# Patient Record
Sex: FEMALE | Race: WHITE | NOT HISPANIC OR LATINO | Employment: UNEMPLOYED | URBAN - METROPOLITAN AREA
[De-identification: names, ages, dates, MRNs, and addresses within clinical notes are randomized per-mention and may not be internally consistent; named-entity substitution may affect disease eponyms.]

---

## 2017-05-12 ENCOUNTER — HOSPITAL ENCOUNTER (OUTPATIENT)
Dept: LAB | Facility: MEDICAL CENTER | Age: 32
End: 2017-05-12
Attending: OBSTETRICS & GYNECOLOGY
Payer: COMMERCIAL

## 2017-05-12 LAB
ALBUMIN SERPL BCP-MCNC: 4.6 G/DL (ref 3.2–4.9)
ALBUMIN/GLOB SERPL: 1.9 G/DL
ALP SERPL-CCNC: 55 U/L (ref 30–99)
ALT SERPL-CCNC: 17 U/L (ref 2–50)
ANION GAP SERPL CALC-SCNC: 5 MMOL/L (ref 0–11.9)
AST SERPL-CCNC: 16 U/L (ref 12–45)
BASOPHILS # BLD AUTO: 0.8 % (ref 0–1.8)
BASOPHILS # BLD: 0.04 K/UL (ref 0–0.12)
BILIRUB SERPL-MCNC: 1 MG/DL (ref 0.1–1.5)
BUN SERPL-MCNC: 17 MG/DL (ref 8–22)
CALCIUM SERPL-MCNC: 9.4 MG/DL (ref 8.5–10.5)
CHLORIDE SERPL-SCNC: 106 MMOL/L (ref 96–112)
CHOLEST SERPL-MCNC: 173 MG/DL (ref 100–199)
CO2 SERPL-SCNC: 26 MMOL/L (ref 20–33)
CREAT SERPL-MCNC: 0.75 MG/DL (ref 0.5–1.4)
EOSINOPHIL # BLD AUTO: 0.12 K/UL (ref 0–0.51)
EOSINOPHIL NFR BLD: 2.4 % (ref 0–6.9)
ERYTHROCYTE [DISTWIDTH] IN BLOOD BY AUTOMATED COUNT: 42.1 FL (ref 35.9–50)
GFR SERPL CREATININE-BSD FRML MDRD: >60 ML/MIN/1.73 M 2
GLOBULIN SER CALC-MCNC: 2.4 G/DL (ref 1.9–3.5)
GLUCOSE SERPL-MCNC: 84 MG/DL (ref 65–99)
HCT VFR BLD AUTO: 42.5 % (ref 37–47)
HDLC SERPL-MCNC: 52 MG/DL
HGB BLD-MCNC: 13.7 G/DL (ref 12–16)
IMM GRANULOCYTES # BLD AUTO: 0.01 K/UL (ref 0–0.11)
IMM GRANULOCYTES NFR BLD AUTO: 0.2 % (ref 0–0.9)
LDLC SERPL CALC-MCNC: 99 MG/DL
LYMPHOCYTES # BLD AUTO: 1.92 K/UL (ref 1–4.8)
LYMPHOCYTES NFR BLD: 39.1 % (ref 22–41)
MCH RBC QN AUTO: 29.3 PG (ref 27–33)
MCHC RBC AUTO-ENTMCNC: 32.2 G/DL (ref 33.6–35)
MCV RBC AUTO: 90.8 FL (ref 81.4–97.8)
MONOCYTES # BLD AUTO: 0.27 K/UL (ref 0–0.85)
MONOCYTES NFR BLD AUTO: 5.5 % (ref 0–13.4)
NEUTROPHILS # BLD AUTO: 2.55 K/UL (ref 2–7.15)
NEUTROPHILS NFR BLD: 52 % (ref 44–72)
NRBC # BLD AUTO: 0 K/UL
NRBC BLD AUTO-RTO: 0 /100 WBC
PLATELET # BLD AUTO: 263 K/UL (ref 164–446)
PMV BLD AUTO: 11.1 FL (ref 9–12.9)
POTASSIUM SERPL-SCNC: 4.2 MMOL/L (ref 3.6–5.5)
PROT SERPL-MCNC: 7 G/DL (ref 6–8.2)
RBC # BLD AUTO: 4.68 M/UL (ref 4.2–5.4)
SODIUM SERPL-SCNC: 137 MMOL/L (ref 135–145)
T4 FREE SERPL-MCNC: 0.78 NG/DL (ref 0.53–1.43)
TRIGL SERPL-MCNC: 108 MG/DL (ref 0–149)
TSH SERPL DL<=0.005 MIU/L-ACNC: 2.28 UIU/ML (ref 0.3–3.7)
WBC # BLD AUTO: 4.9 K/UL (ref 4.8–10.8)

## 2017-05-12 PROCEDURE — 80061 LIPID PANEL: CPT

## 2017-05-12 PROCEDURE — 80053 COMPREHEN METABOLIC PANEL: CPT

## 2017-05-12 PROCEDURE — 85025 COMPLETE CBC W/AUTO DIFF WBC: CPT

## 2017-05-12 PROCEDURE — 84439 ASSAY OF FREE THYROXINE: CPT

## 2017-05-12 PROCEDURE — 36415 COLL VENOUS BLD VENIPUNCTURE: CPT

## 2017-05-12 PROCEDURE — 84443 ASSAY THYROID STIM HORMONE: CPT

## 2017-05-17 ENCOUNTER — HOSPITAL ENCOUNTER (OUTPATIENT)
Dept: RADIOLOGY | Facility: MEDICAL CENTER | Age: 32
End: 2017-05-17
Attending: OBSTETRICS & GYNECOLOGY
Payer: COMMERCIAL

## 2017-05-17 DIAGNOSIS — N93.8 DYSFUNCTIONAL UTERINE BLEEDING: ICD-10-CM

## 2017-05-17 PROCEDURE — 76830 TRANSVAGINAL US NON-OB: CPT

## 2017-07-19 ENCOUNTER — OFFICE VISIT (OUTPATIENT)
Dept: URGENT CARE | Facility: CLINIC | Age: 32
End: 2017-07-19
Payer: COMMERCIAL

## 2017-07-19 VITALS
OXYGEN SATURATION: 97 % | SYSTOLIC BLOOD PRESSURE: 120 MMHG | RESPIRATION RATE: 16 BRPM | WEIGHT: 150 LBS | TEMPERATURE: 99.3 F | BODY MASS INDEX: 24.99 KG/M2 | DIASTOLIC BLOOD PRESSURE: 76 MMHG | HEIGHT: 65 IN | HEART RATE: 77 BPM

## 2017-07-19 DIAGNOSIS — J01.80 OTHER ACUTE SINUSITIS: ICD-10-CM

## 2017-07-19 PROCEDURE — 99214 OFFICE O/P EST MOD 30 MIN: CPT | Performed by: PHYSICIAN ASSISTANT

## 2017-07-19 RX ORDER — CEFUROXIME AXETIL 500 MG/1
500 TABLET ORAL 2 TIMES DAILY
Qty: 20 TAB | Refills: 0 | Status: SHIPPED | OUTPATIENT
Start: 2017-07-19 | End: 2017-07-29

## 2017-07-19 ASSESSMENT — ENCOUNTER SYMPTOMS
COUGH: 0
EYES NEGATIVE: 1
SINUS PRESSURE: 1
SORE THROAT: 1
HEADACHES: 1
RESPIRATORY NEGATIVE: 1
CONSTITUTIONAL NEGATIVE: 1

## 2017-07-19 NOTE — PROGRESS NOTES
"Subjective:      Nicolasa Magaña is a 32 y.o. female who presents with Sinus Problem            Sinus Problem  This is a new problem. The current episode started in the past 7 days. The problem is unchanged. There has been no fever. The pain is mild. Associated symptoms include congestion, headaches, sinus pressure and a sore throat. Pertinent negatives include no coughing. Past treatments include nothing. The treatment provided no relief.       Review of Systems   Constitutional: Negative.    HENT: Positive for congestion, sinus pressure and sore throat.    Eyes: Negative.    Respiratory: Negative.  Negative for cough.    Skin: Negative.    Neurological: Positive for headaches.          Objective:     /76 mmHg  Pulse 77  Temp(Src) 37.4 °C (99.3 °F)  Resp 16  Ht 1.651 m (5' 5\")  Wt 68.04 kg (150 lb)  BMI 24.96 kg/m2  SpO2 97%     Physical Exam   Constitutional: She is oriented to person, place, and time. She appears well-developed and well-nourished. No distress.   HENT:   Head: Normocephalic and atraumatic.   +maxill.sinus press.     Eyes: EOM are normal. Pupils are equal, round, and reactive to light.   Neck: Normal range of motion. Neck supple.   Cardiovascular: Normal rate.    Pulmonary/Chest: Effort normal and breath sounds normal. No respiratory distress.   Lymphadenopathy:     She has no cervical adenopathy.   Neurological: She is alert and oriented to person, place, and time.   Skin: Skin is warm and dry.   Psychiatric: She has a normal mood and affect. Her behavior is normal. Judgment and thought content normal.   Nursing note and vitals reviewed.    Filed Vitals:    07/19/17 1459   BP: 120/76   Pulse: 77   Temp: 37.4 °C (99.3 °F)   Resp: 16   Height: 1.651 m (5' 5\")   Weight: 68.04 kg (150 lb)   SpO2: 97%     Active Ambulatory Problems     Diagnosis Date Noted   • Epigastric abdominal pain 01/07/2015   • Depression 01/07/2015     Resolved Ambulatory Problems     Diagnosis Date Noted "   • Dehydration 11/22/2013   • Hyperemesis arising during pregnancy 11/22/2013   • Gastroenteritis 11/22/2013   • Abnormal thyroid blood test 04/01/2014   • Fatigue 04/01/2014   • Tachycardia 04/01/2014   • Labor and delivery, indication for care 06/16/2014     Past Medical History   Diagnosis Date   • Other specified disorder of intestines    • Bronchitis 2009   • Pneumonia    • Post-partum depression      Current Outpatient Prescriptions on File Prior to Visit   Medication Sig Dispense Refill   • promethazine (PHENERGAN) 25 MG Tab Take 1 Tab by mouth every 6 hours as needed for Nausea/Vomiting. 30 Tab 0   • hydrocodone-acetaminophen (NORCO) 5-325 MG Tab per tablet Take 1-2 Tabs by mouth every four hours as needed ((Pain Scale 4-6); if allergic/unable to tolerate oxycodone, or if acetaminophen ineffective). 30 Tab 0   • ibuprofen (MOTRIN) 600 MG Tab Take 1 Tab by mouth every 6 hours as needed (Cramping). 30 Tab 3     No current facility-administered medications on file prior to visit.     Gargles, Cepacol lozenges, Aleve/Advil as needed for throat pain  Family History   Problem Relation Age of Onset   • Alcohol/Drug Mother    • Stroke Mother    • Heart Disease Sister      half sister mom side heart murmur   • Depression Maternal Grandmother    • Depression Maternal Grandfather    • Cancer Paternal Grandfather      lung cancer     [place] meal; Brazil nuts; Cashew nut oil; Macadamia nut oil; Other food; Pecan; Ultram; and Zofran              Assessment/Plan:     1. Other acute sinusitis     · -Start w/MucinexD; nsaids; [rx abx prn sx persist/worsen]  ·

## 2017-07-19 NOTE — MR AVS SNAPSHOT
"        Nicolasa Magaña   2017 2:45 PM   Office Visit   MRN: [id number]    Department:  [facility]   Dept Phone:  [phone]    Description:  Female : 1985   Provider:  Dante Maier PA-C           Reason for Visit     Sinus Problem x1week, sinus pain, ear pain, nasal drip, sore throat, chills, headache      Allergies as of 2017     Allergen Noted Reactions    [place] Meal 2014       Canker sores    [place] Nuts 2014       Canker sores    Cashew Nut Oil 2014       Canker sores    Macadamia Nut Oil 2014       Canker sores    Other Food 2011       Walnuts cause throat swelling but not anaphylactic. Chestnuts cause canker sores    Pecan 2014       Canker sores    Ultram [Tramadol Hcl] 2012       Nausea and dizziness.    Zofran [Ondansetron] 2015   Unspecified    migraine      You were diagnosed with     Other acute sinusitis   [461.8.ICD-9-CM]         Vital Signs     Blood Pressure Pulse Temperature Respirations Height Weight    120/76 mmHg 77 37.4 °C (99.3 °F) 16 1.651 m (5' 5\") 68.04 kg (150 lb)    Body Mass Index Oxygen Saturation Smoking Status             24.96 kg/m2 97% Never Smoker          Basic Information     Date Of Birth Sex Race Ethnicity Preferred Language    1985 Female White Non- English      Problem List              ICD-10-CM Priority Class Noted - Resolved    Epigastric abdominal pain R10.13   2015 - Present    Depression (Chronic) F32.9   2015 - Present      Health Maintenance        Date Due Completion Dates    PAP SMEAR 2008 ---    IMM INFLUENZA (1) 2017 ---    IMM DTaP/Tdap/Td Vaccine (2 - Td) 2024            Current Immunizations     Tdap Vaccine 2014      Below and/or attached are the medications your provider expects you to take. Review all of your home medications and newly ordered medications with your provider and/or pharmacist. Follow medication instructions as " directed by your provider and/or pharmacist. Please keep your medication list with you and share with your provider. Update the information when medications are discontinued, doses are changed, or new medications (including over-the-counter products) are added; and carry medication information at all times in the event of emergency situations     Allergies:  [place] MEAL - (reactions not documented)     BRAZIL NUTS - (reactions not documented)     CASHEW NUT OIL - (reactions not documented)     MACADAMIA NUT OIL - (reactions not documented)     OTHER FOOD - (reactions not documented)     PECAN - (reactions not documented)     ULTRAM - (reactions not documented)     ZOFRAN - Unspecified               Medications  Valid as of: July 19, 2017 -  3:45 PM    Generic Name Brand Name Tablet Size Instructions for use    Cefuroxime Axetil (Tab) CEFTIN 500 MG Take 1 Tab by mouth 2 times a day for 10 days.        Hydrocodone-Acetaminophen (Tab) NORCO 5-325 MG Take 1-2 Tabs by mouth every four hours as needed ((Pain Scale 4-6); if allergic/unable to tolerate oxycodone, or if acetaminophen ineffective).        Ibuprofen (Tab) MOTRIN 600 MG Take 1 Tab by mouth every 6 hours as needed (Cramping).        Promethazine HCl (Tab) PHENERGAN 25 MG Take 1 Tab by mouth every 6 hours as needed for Nausea/Vomiting.        .                 Medicines prescribed today were sent to:     [id number] - [place], [place] - [address]    [address] [place] [address]    Phone: [phone] Fax: [phone]    Open 24 Hours?: No      Medication refill instructions:       If your prescription bottle indicates you have medication refills left, it is not necessary to call your provider’s office. Please contact your pharmacy and they will refill your medication.    If your prescription bottle indicates you do not have any refills left, you may request refills at any time through one of the following ways: The online JMEA system (except Urgent  Care), by calling your provider’s office, or by asking your pharmacy to contact your provider’s office with a refill request. Medication refills are processed only during regular business hours and may not be available until the next business day. Your provider may request additional information or to have a follow-up visit with you prior to refilling your medication.   *Please Note: Medication refills are assigned a new Rx number when refilled electronically. Your pharmacy may indicate that no refills were authorized even though a new prescription for the same medication is available at the pharmacy. Please request the medicine by name with the pharmacy before contacting your provider for a refill.           [employer] Access Code: Activation code not generated  Current [place] Status: Active

## 2017-09-08 ENCOUNTER — HOSPITAL ENCOUNTER (OUTPATIENT)
Dept: RADIOLOGY | Facility: MEDICAL CENTER | Age: 32
End: 2017-09-08
Attending: OBSTETRICS & GYNECOLOGY
Payer: COMMERCIAL

## 2017-09-08 DIAGNOSIS — N93.8 DYSFUNCTIONAL UTERINE BLEEDING: ICD-10-CM

## 2017-09-08 PROCEDURE — 76830 TRANSVAGINAL US NON-OB: CPT

## 2017-10-12 ENCOUNTER — OFFICE VISIT (OUTPATIENT)
Dept: MEDICAL GROUP | Facility: MEDICAL CENTER | Age: 32
End: 2017-10-12
Payer: COMMERCIAL

## 2017-10-12 VITALS
TEMPERATURE: 99.2 F | BODY MASS INDEX: 25.49 KG/M2 | WEIGHT: 153 LBS | HEIGHT: 65 IN | HEART RATE: 86 BPM | RESPIRATION RATE: 16 BRPM | SYSTOLIC BLOOD PRESSURE: 114 MMHG | OXYGEN SATURATION: 97 % | DIASTOLIC BLOOD PRESSURE: 64 MMHG

## 2017-10-12 DIAGNOSIS — B97.7 HPV IN FEMALE: ICD-10-CM

## 2017-10-12 DIAGNOSIS — F33.1 MODERATE EPISODE OF RECURRENT MAJOR DEPRESSIVE DISORDER (HCC): ICD-10-CM

## 2017-10-12 PROBLEM — F33.9 EPISODE OF RECURRENT MAJOR DEPRESSIVE DISORDER (HCC): Status: ACTIVE | Noted: 2017-10-12

## 2017-10-12 PROCEDURE — 99214 OFFICE O/P EST MOD 30 MIN: CPT | Performed by: INTERNAL MEDICINE

## 2017-10-12 RX ORDER — SERTRALINE HYDROCHLORIDE 25 MG/1
25 TABLET, FILM COATED ORAL DAILY
Qty: 30 TAB | Refills: 11 | Status: SHIPPED | OUTPATIENT
Start: 2017-10-12 | End: 2018-02-13 | Stop reason: SDUPTHER

## 2017-10-12 ASSESSMENT — PATIENT HEALTH QUESTIONNAIRE - PHQ9
5. POOR APPETITE OR OVEREATING: 2 - MORE THAN HALF THE DAYS
CLINICAL INTERPRETATION OF PHQ2 SCORE: 6
SUM OF ALL RESPONSES TO PHQ QUESTIONS 1-9: 20

## 2017-10-12 NOTE — LETTER
[facility]  Kusum Mcclain M.D.  [address]  [place] [address]  Fax: [phone]   Authorization for Release/Disclosure of   Protected Health Information   Name: CHRISTINE LARA : 1985 SSN: [id number]   Address: [address] [place] [address]  [place] [address] Phone:    [phone] (home)    I authorize the entity listed below to release/disclose the PHI below to:   [facility]/Kusum Mcclain M.D. and Kusum Mcclain M.D.   Provider or Entity Name:     Address   City, State, Zip   Phone:    Fax:   Reason for request: continuity of care   Information to be released:    [  ] LAST COLONOSCOPY,  including any PATH REPORT and follow-up  [  ] LAST FIT/COLOGUARD RESULT [  ] LAST DEXA  [  ] LAST MAMMOGRAM  [  ] LAST PAP  [  ] LAST LABS [  ] RETINA EXAM REPORT  [  ] IMMUNIZATION RECORDS  [  ] Release all info      [  ] Check here and initial the line next to each item to release ALL health information INCLUDING  _____ Care and treatment for drug and / or alcohol abuse  _____ HIV testing, infection status, or AIDS  _____ Genetic Testing    DATES OF SERVICE OR TIME PERIOD TO BE DISCLOSED: _____________  I understand and acknowledge that:  * This Authorization may be revoked at any time by you in writing, except if your health information has already been used or disclosed.  * Your health information that will be used or disclosed as a result of you signing this authorization could be re-disclosed by the recipient. If this occurs, your re-disclosed health information may no longer be protected by State or Federal laws.  * You may refuse to sign this Authorization. Your refusal will not affect your ability to obtain treatment.  * This Authorization becomes effective upon signing and will  on (date) __________.      If no date is indicated, this Authorization will  one (1) year from the signature date.    Name: Christine Lara    Signature:   Date:     10/12/2017       PLEASE FAX  REQUESTED RECORDS BACK TO: [phone]

## 2017-10-12 NOTE — PROGRESS NOTES
Chief Complaint   Patient presents with   • Establish Care   • Anxiety   • Depression     HX: Post partum depression  x 6-7 mo after weaning off her zoloft.       HISTORY OF PRESENT ILLNESS: Patient is a 32 y.o. female patient who presents today to discuss the evaluation and management of:      Patient has never had a PCP before, she has previously been followed by her OB/GYN. She has 3 children, a 9-year-old son and a 2 and 3-year-old.    1. Moderate episode of recurrent major depressive disorder (CMS-[facility])    Patient was given a diagnosis of postpartum depression and took Zoloft for about 6 months following the birth of her now 2-year-old son. Over the past several months and particularly the last 2 weeks the patient has had increasing mood lability with easy crying and almost nightly panic attacks. She says she realizes that she is being irrational but she just can't control it. Her mother is addicted to prescription medications including benzodiazepines so the patient is adamantly against taking these. She did feel considerably better when she was taking low-dose Zoloft. Higher dose of 50 mg resulted in GI upset. Depression score today in office is 20. She denies suicidal ideation.  There is a family history of depression in her maternal grandparents.  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 She sees her gynecologist every 3 months for follow-up.    Patient Active Problem List    Diagnosis Date Noted   • Episode of recurrent major depressive disorder (CMS-[facility]) 10/12/2017   • HPV in female 10/12/2017        Allergies:[place] meal; Brazil nuts; Cashew nut oil; Macadamia nut oil; Other food; Pecan; Ultram [tramadol hcl]; and Zofran [ondansetron]    Current meds including changes today  Current Outpatient Prescriptions   Medication Sig Dispense Refill   • sertraline (ZOLOFT) 25 MG tablet Take 1 Tab by mouth every day. 30 Tab 11     No current  "facility-administered medications for this visit.      Social History   Substance Use Topics   • Smoking status: Never Smoker   • Smokeless tobacco: Never Used   • Alcohol use No     Social History     Social History Narrative   • No narrative on file       Family History   Problem Relation Age of Onset   • Alcohol/Drug Mother    • Stroke Mother    • Heart Disease Sister      half sister mom side heart murmur   • Depression Maternal Grandmother    • Depression Maternal Grandfather    • Cancer Paternal Grandfather      lung cancer       Review of Systems:  No chest pain, No shortness of breath, No dyspnea on exertion  Gastrointestinal ROS: No abdominal pain, No nausea, vomiting, diarrhea, or constipation        Exam:      Blood pressure 114/64, pulse 86, temperature 37.3 °C (99.2 °F), resp. rate 16, height 1.651 m (5' 5\"), weight 69.4 kg (153 lb), last menstrual period 10/11/2017, SpO2 97 %.  General:  Well nourished, well developed female in NAD affect and mood within normal limits  Head is grossly normal.  Neck: Supple without adenopathy  Pulmonary: Clear to ausculation.  Normal effort. No rales, rhonchi, or wheezing.  Cardiovascular: Regular rate and rhythm without murmur.   Extremities: no clubbing, cyanosis, or edema.  Neuro: moves all extremities symmetrically    Please note that this dictation was created using voice recognition software. I have made every reasonable attempt to correct obvious errors, but I expect that there are errors of grammar and possibly content that I did not discover before finalizing the note.    Assessment/Plan:  1. Moderate episode of recurrent major depressive disorder (CMS-HCC)    We discussed that her depression and anxiety are likely due to a biochemical imbalance in her brain, and treatment with medication is necessary and that he will not become addicted to the medication.  - sertraline (ZOLOFT) 25 MG tablet; Take 1 Tab by mouth every day.  Dispense: 30 Tab; Refill: 11  - " Patient has been identified as being depressed and appropriate orders and counseling have been given    2. HPV in female    Continue to follow up with gynecologist.    Followup: Patient was offered follow-up appointment, she declines. She will let me know if she is not doing well with resumption of the Zoloft.

## 2017-10-26 ENCOUNTER — TELEPHONE (OUTPATIENT)
Dept: MEDICAL GROUP | Facility: MEDICAL CENTER | Age: 32
End: 2017-10-26

## 2017-10-26 DIAGNOSIS — J34.89 SINUS PRESSURE: ICD-10-CM

## 2017-10-26 NOTE — TELEPHONE ENCOUNTER
Please advise patient I submitted the referral. The [facility] referral coordinator will contact her  via [employer] or email after authorizations are obtained. She  may then schedule her  own appointment. If she  does not hear from the referral coordinator within 5 days, please call [phone] and tell them she  is calling about referral status. Kusum Mcclain M.D. covering for Kusum Mcclain M.D.

## 2017-10-26 NOTE — TELEPHONE ENCOUNTER
1. Caller Name: Nicolasa Magaña                                           Call Back Number: [phone] (home)         Patient approves a detailed voicemail message: N\A    2. SPECIFIC Action To Be Taken: Referral pending, please sign.    3. Diagnosis/Clinical Reason for Request: constant sinus pressure, headaches and trouble breathing out of  right nostril    4. Specialty & Provider Name/Lab/Imaging Location: N/A    5. Is appointment scheduled for requested order/referral: no    Patient informed they will receive a return phone call from the office ONLY if there are any questions before processing their request. Advised to call back if they haven't received a call from the referral department in 5 days.

## 2017-10-26 NOTE — TELEPHONE ENCOUNTER
----- Message from Vivek Cronin sent at 10/26/2017 10:27 AM PDT -----  Regarding: FW: ENT Referral  Contact: [phone]      ----- Message -----  From: Nicolasa Magaña  Sent: 10/25/2017  11:32 AM  To: Yann Dumont  Subject: ENT Referral                                     Topic: Referral Status    I was hoping to obtain a referral to [facility] for constant sinus pressure, headaches and trouble breathing out of  my right nostril. I have had these symptoms since my visit to urgent care in January.   Please contact me for any questions.     Thank you.   Nicolasa Magaña  [phone]

## 2017-12-23 ENCOUNTER — OFFICE VISIT (OUTPATIENT)
Dept: URGENT CARE | Facility: CLINIC | Age: 32
End: 2017-12-23
Payer: COMMERCIAL

## 2017-12-23 ENCOUNTER — HOSPITAL ENCOUNTER (OUTPATIENT)
Facility: MEDICAL CENTER | Age: 32
End: 2017-12-23
Attending: PHYSICIAN ASSISTANT
Payer: COMMERCIAL

## 2017-12-23 VITALS
HEIGHT: 65 IN | BODY MASS INDEX: 24.99 KG/M2 | TEMPERATURE: 97.8 F | HEART RATE: 117 BPM | WEIGHT: 150 LBS | DIASTOLIC BLOOD PRESSURE: 72 MMHG | OXYGEN SATURATION: 99 % | SYSTOLIC BLOOD PRESSURE: 112 MMHG

## 2017-12-23 DIAGNOSIS — N30.90 CYSTITIS: ICD-10-CM

## 2017-12-23 LAB
APPEARANCE UR: NORMAL
BILIRUB UR STRIP-MCNC: NORMAL MG/DL
COLOR UR AUTO: YELLOW
GLUCOSE UR STRIP.AUTO-MCNC: NORMAL MG/DL
KETONES UR STRIP.AUTO-MCNC: NORMAL MG/DL
LEUKOCYTE ESTERASE UR QL STRIP.AUTO: NORMAL
NITRITE UR QL STRIP.AUTO: NORMAL
PH UR STRIP.AUTO: 6.5 [PH] (ref 5–8)
PROT UR QL STRIP: NORMAL MG/DL
RBC UR QL AUTO: NORMAL
SP GR UR STRIP.AUTO: 1.01
UROBILINOGEN UR STRIP-MCNC: NORMAL MG/DL

## 2017-12-23 PROCEDURE — 99213 OFFICE O/P EST LOW 20 MIN: CPT | Performed by: PHYSICIAN ASSISTANT

## 2017-12-23 PROCEDURE — 99000 SPECIMEN HANDLING OFFICE-LAB: CPT | Performed by: PHYSICIAN ASSISTANT

## 2017-12-23 PROCEDURE — 81002 URINALYSIS NONAUTO W/O SCOPE: CPT | Performed by: PHYSICIAN ASSISTANT

## 2017-12-23 PROCEDURE — 87086 URINE CULTURE/COLONY COUNT: CPT

## 2017-12-23 RX ORDER — CEFUROXIME AXETIL 500 MG/1
500 TABLET ORAL 2 TIMES DAILY
Qty: 10 TAB | Refills: 0 | Status: SHIPPED | OUTPATIENT
Start: 2017-12-23 | End: 2017-12-28

## 2017-12-23 RX ORDER — PHENAZOPYRIDINE HYDROCHLORIDE 200 MG/1
200 TABLET, FILM COATED ORAL 3 TIMES DAILY PRN
Qty: 6 TAB | Refills: 0 | Status: SHIPPED | OUTPATIENT
Start: 2017-12-23 | End: 2018-11-06

## 2017-12-23 RX ORDER — IBUPROFEN 200 MG
200 TABLET ORAL EVERY 6 HOURS PRN
COMMUNITY
End: 2021-10-13

## 2017-12-23 NOTE — PROGRESS NOTES
"Subjective:      Nicolasa Magaña is a 32 y.o. female who presents with UTI (X last night, frequency, urgency, burning, pelvic pain )            Dysuria    This is a new problem. The current episode started yesterday. The problem occurs every urination. The problem has been unchanged. The quality of the pain is described as burning. The pain is moderate. There has been no fever. Associated symptoms include frequency and urgency. Pertinent negatives include no hematuria. She has tried nothing for the symptoms. The treatment provided no relief. There is no history of catheterization, kidney stones, recurrent UTIs, a single kidney, urinary stasis or a urological procedure.       Review of Systems   Genitourinary: Positive for dysuria, frequency and urgency. Negative for hematuria.          Objective:     LMP 10/11/2017      Physical Exam   Constitutional: She is oriented to person, place, and time. She appears well-developed and well-nourished. No distress.   Abdominal: She exhibits no distension. There is no tenderness.   Neurological: She is alert and oriented to person, place, and time.   Skin: Skin is warm and dry.   Psychiatric: She has a normal mood and affect. Her behavior is normal. Judgment and thought content normal.   Nursing note and vitals reviewed.    Vitals:    12/23/17 0918   BP: 112/72   Pulse: (!) 117   Temp: 36.6 °C (97.8 °F)   SpO2: 99%   Weight: 68 kg (150 lb)   Height: 1.638 m (5' 4.5\")     Active Ambulatory Problems     Diagnosis Date Noted   • Episode of recurrent major depressive disorder (CMS-[facility]) 10/12/2017   • HPV in female 10/12/2017     Resolved Ambulatory Problems     Diagnosis Date Noted   • Dehydration 11/22/2013   • Hyperemesis arising during pregnancy 11/22/2013   • Gastroenteritis 11/22/2013   • Abnormal thyroid blood test 04/01/2014   • Fatigue 04/01/2014   • Tachycardia 04/01/2014   • Labor and delivery, indication for care 06/16/2014   • Epigastric abdominal pain 01/07/2015 "   • Depression 01/07/2015     Past Medical History:   Diagnosis Date   • Bronchitis 2009   • Depression 1/7/2015   • Other specified disorder of intestines    • Pneumonia    • Post-partum depression    • Sinus tachycardia      Current Outpatient Prescriptions on File Prior to Visit   Medication Sig Dispense Refill   • sertraline (ZOLOFT) 25 MG tablet Take 1 Tab by mouth every day. 30 Tab 11     No current facility-administered medications on file prior to visit.      Gargles, Cepacol lozenges, Aleve/Advil as needed for throat pain  Family History   Problem Relation Age of Onset   • Alcohol/Drug Mother    • Stroke Mother    • Heart Disease Sister      half sister mom side heart murmur   • Depression Maternal Grandmother    • Depression Maternal Grandfather    • Cancer Paternal Grandfather      lung cancer     [place] meal; Brazil nuts; Cashew nut oil; Macadamia nut oil; Other food; Pecan; Ultram [tramadol hcl]; and Zofran [ondansetron]         ua+   Assessment/Plan:     ·  uti      · rx abx; cx

## 2017-12-24 DIAGNOSIS — N30.90 CYSTITIS: ICD-10-CM

## 2017-12-26 ENCOUNTER — TELEPHONE (OUTPATIENT)
Dept: URGENT CARE | Facility: CLINIC | Age: 32
End: 2017-12-26

## 2017-12-26 LAB
BACTERIA UR CULT: NORMAL
SIGNIFICANT IND 70042: NORMAL
SITE SITE: NORMAL
SOURCE SOURCE: NORMAL

## 2018-02-13 DIAGNOSIS — F33.1 MODERATE EPISODE OF RECURRENT MAJOR DEPRESSIVE DISORDER (HCC): ICD-10-CM

## 2018-02-14 NOTE — TELEPHONE ENCOUNTER
Was the patient seen in the last year in this department? Yes     Does patient have an active prescription for medications requested? No     Received Request Via: Patient     Per patient, states she had a discussion with Dr. Mcclain about increasing her medication Zoloft from 25 mg to 50 mg.    Requesting a new script to be faxed to her pharmacy.    Please advise, thank you.

## 2018-02-15 DIAGNOSIS — F33.1 MODERATE EPISODE OF RECURRENT MAJOR DEPRESSIVE DISORDER (HCC): ICD-10-CM

## 2018-05-16 ENCOUNTER — TELEMEDICINE2 (OUTPATIENT)
Dept: URGENT CARE | Facility: CLINIC | Age: 33
End: 2018-05-16

## 2018-05-16 DIAGNOSIS — H10.9 CONJUNCTIVITIS OF LEFT EYE, UNSPECIFIED CONJUNCTIVITIS TYPE: ICD-10-CM

## 2018-05-16 PROCEDURE — 99213 OFFICE O/P EST LOW 20 MIN: CPT | Performed by: NURSE PRACTITIONER

## 2018-05-16 RX ORDER — POLYMYXIN B SULFATE AND TRIMETHOPRIM 1; 10000 MG/ML; [USP'U]/ML
1 SOLUTION OPHTHALMIC EVERY 4 HOURS
Qty: 10 ML | Refills: 0 | Status: SHIPPED | OUTPATIENT
Start: 2018-05-16 | End: 2018-05-23

## 2018-05-16 ASSESSMENT — ENCOUNTER SYMPTOMS
DOUBLE VISION: 0
EYE DISCHARGE: 1
WEAKNESS: 0
EYE REDNESS: 1
BLURRED VISION: 0
CHILLS: 0
FEVER: 0
DIAPHORESIS: 0
PHOTOPHOBIA: 0
EYE PAIN: 0

## 2018-05-16 NOTE — PROGRESS NOTES
Subjective:      Nicolasa Magaña is a 33 y.o. female who presents with Conjunctivitis (left eye redness/ drainage since yesterday )            Patient presents today for a virtual visit.  Identification was verified.  She was informed that encounter would be conducted using secure, encrypted videoconferencing equipment and consent was obtained.  She reports left eye redness with a gritty sensation and matting purulence since yesterday.  She denies any trauma or exposure to debris or irritants.  No recent URI.  She has young children who recently had pink eye earlier this week.  She denies any eye pain or vision changes.  No contact lens use.          Review of Systems   Constitutional: Negative for chills, diaphoresis, fever and malaise/fatigue.   HENT: Negative for congestion and ear pain.    Eyes: Positive for discharge and redness. Negative for blurred vision, double vision, photophobia and pain.   Skin: Negative for itching and rash.   Neurological: Negative for weakness.     Medications, Allergies, and current problem list reviewed today in Epic     Objective:     [facility] 10/11/2017      Physical Exam   Constitutional: She is oriented to person, place, and time. She appears well-developed and well-nourished. No distress.   Eyes: EOM are normal. Pupils are equal, round, and reactive to light. Left eye exhibits discharge.   Left conjunctival is injected with scant matting purulence at the lower lash line and inner canthus.  No periorbital pain, swelling, or erythema.     Pulmonary/Chest: Effort normal.   Neurological: She is alert and oriented to person, place, and time.   Skin: She is not diaphoretic.               Assessment/Plan:     1. Conjunctivitis of left eye, unspecified conjunctivitis type  - polymixin-trimethoprim (POLYTRIM) 17528-1.1 UNIT/ML-% Solution; Place 1 Drop in left eye every 4 hours for 7 days.  Dispense: 10 mL; Refill: 0    Discussed with patient that conjunctivitis can be bacterial,  viral, or allergic.  Will treat presuming a bacterial infection.  Advised that it is highly contagious and avoid touching face and eyes.  Clean any crusting or matting from eyes with a warm cloth and launder cloth before reuse.  Maintain adequate po hydration.  RTC in 2-3 days if symptoms do not improve, sooner if worse.  Patient verbalized understanding of and agreed with plan of care.

## 2018-09-12 ENCOUNTER — HOSPITAL ENCOUNTER (OUTPATIENT)
Dept: HOSPITAL 8 - STAR | Age: 33
Discharge: HOME | End: 2018-09-12
Attending: SPECIALIST
Payer: COMMERCIAL

## 2018-09-12 DIAGNOSIS — Z02.9: Primary | ICD-10-CM

## 2018-09-18 ENCOUNTER — HOSPITAL ENCOUNTER (OUTPATIENT)
Dept: HOSPITAL 8 - OUT | Age: 33
Discharge: HOME | End: 2018-09-18
Attending: SPECIALIST
Payer: COMMERCIAL

## 2018-09-18 VITALS — WEIGHT: 150.31 LBS | HEIGHT: 64 IN | BODY MASS INDEX: 25.66 KG/M2

## 2018-09-18 DIAGNOSIS — Z72.89: ICD-10-CM

## 2018-09-18 DIAGNOSIS — F41.9: ICD-10-CM

## 2018-09-18 DIAGNOSIS — G43.909: ICD-10-CM

## 2018-09-18 DIAGNOSIS — N94.6: ICD-10-CM

## 2018-09-18 DIAGNOSIS — Z90.49: ICD-10-CM

## 2018-09-18 DIAGNOSIS — N70.11: ICD-10-CM

## 2018-09-18 DIAGNOSIS — N94.10: ICD-10-CM

## 2018-09-18 DIAGNOSIS — Z87.442: ICD-10-CM

## 2018-09-18 DIAGNOSIS — Z88.6: ICD-10-CM

## 2018-09-18 DIAGNOSIS — N83.8: ICD-10-CM

## 2018-09-18 DIAGNOSIS — Z88.8: ICD-10-CM

## 2018-09-18 DIAGNOSIS — Z87.01: ICD-10-CM

## 2018-09-18 DIAGNOSIS — Z98.890: ICD-10-CM

## 2018-09-18 DIAGNOSIS — N92.0: Primary | ICD-10-CM

## 2018-09-18 DIAGNOSIS — N73.6: ICD-10-CM

## 2018-09-18 LAB — HCG UR SG: 1 (ref 1–1.03)

## 2018-09-18 PROCEDURE — 88307 TISSUE EXAM BY PATHOLOGIST: CPT

## 2018-09-18 PROCEDURE — 81025 URINE PREGNANCY TEST: CPT

## 2018-09-18 PROCEDURE — S0074 INJECTION, CEFOTETAN DISODIU: HCPCS

## 2018-09-18 PROCEDURE — 58571 TLH W/T/O 250 G OR LESS: CPT

## 2018-10-30 DIAGNOSIS — F33.1 MODERATE EPISODE OF RECURRENT MAJOR DEPRESSIVE DISORDER (HCC): ICD-10-CM

## 2018-10-30 NOTE — TELEPHONE ENCOUNTER
Was the patient seen in the last year in this department? Yes    Does patient have an active prescription for medications requested? No     Received Request Via: Patient     Future Appointments       Provider Department Center    11/6/2018 4:00 PM Kusum Mcclain M.D. [facility]

## 2018-11-06 ENCOUNTER — OFFICE VISIT (OUTPATIENT)
Dept: MEDICAL GROUP | Facility: MEDICAL CENTER | Age: 33
End: 2018-11-06
Payer: COMMERCIAL

## 2018-11-06 VITALS
TEMPERATURE: 98.4 F | WEIGHT: 161 LBS | RESPIRATION RATE: 156 BRPM | OXYGEN SATURATION: 97 % | DIASTOLIC BLOOD PRESSURE: 84 MMHG | BODY MASS INDEX: 26.82 KG/M2 | HEART RATE: 84 BPM | HEIGHT: 65 IN | SYSTOLIC BLOOD PRESSURE: 126 MMHG

## 2018-11-06 DIAGNOSIS — F33.0 MILD EPISODE OF RECURRENT MAJOR DEPRESSIVE DISORDER (HCC): ICD-10-CM

## 2018-11-06 DIAGNOSIS — Z23 NEED FOR VACCINATION: ICD-10-CM

## 2018-11-06 DIAGNOSIS — N87.1 DYSPLASIA OF CERVIX, HIGH GRADE CIN 2: ICD-10-CM

## 2018-11-06 PROBLEM — B97.7 HPV IN FEMALE: Status: RESOLVED | Noted: 2017-10-12 | Resolved: 2018-11-06

## 2018-11-06 PROCEDURE — 90471 IMMUNIZATION ADMIN: CPT | Performed by: INTERNAL MEDICINE

## 2018-11-06 PROCEDURE — 99213 OFFICE O/P EST LOW 20 MIN: CPT | Mod: 25 | Performed by: INTERNAL MEDICINE

## 2018-11-06 PROCEDURE — 90686 IIV4 VACC NO PRSV 0.5 ML IM: CPT | Performed by: INTERNAL MEDICINE

## 2018-11-07 NOTE — PROGRESS NOTES
Chief Complaint   Patient presents with   • Annual Exam   • Depression     discuss increasing zoloft     Chief complaint: Follow-up depression, last seen 1 year ago.    HISTORY OF PRESENT ILLNESS: Patient is a 33 y.o. female patient who presents today to discuss the evaluation and management of:          1. Need for vaccination    Requesting flu shot    2. Mild episode of recurrent major depressive disorder (HCC)    When I saw the patient a year ago, she was experiencing mild depression.  She was started on Zoloft, and has been taking 50 mg daily since that time.  Over the past 2 months or so, she has had mild recurrent symptoms, primarily with inertia, mild anhedonia.  She basically does not feel like getting up off the couch and going out and doing things like she used to.  Some of this is been exacerbated by recent hysterectomy (see below), she has not yet been returned to her full activity.  She is also very busy with 3 young children, ages 3, 4, and 10.    3. 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  She was also experiencing menorrhagia.  Due to this, she had laparoscopic robotic subtotal hysterectomy 7 weeks ago.  Unfortunately, she had some internal stitch tearing, and has not returned to her normal exercise program.        Patient Active Problem List    Diagnosis Date Noted   • Dysplasia of cervix, high grade STEPHANIE 2 11/06/2018   • Episode of recurrent major depressive disorder (HCC) 10/12/2017        Allergies:[place] meal; Brazil nuts; Cashew nut oil; Macadamia nut oil; Other food; Pecan; Ultram [tramadol hcl]; and Zofran [ondansetron]    Current meds including changes today  Current Outpatient Prescriptions   Medication Sig Dispense Refill   • sertraline (ZOLOFT) 50 MG Tab Take 1 Tab by mouth every day. 90 Tab 0   • ibuprofen (MOTRIN) 200 MG Tab Take 200 mg by mouth every 6 hours as needed.       No current  "facility-administered medications for this visit.      Social History   Substance Use Topics   • Smoking status: Never Smoker   • Smokeless tobacco: Never Used   • Alcohol use No     Social History     Social History Narrative   • No narrative on file       Family History   Problem Relation Age of Onset   • Alcohol/Drug Mother    • Stroke Mother    • Heart Disease Sister         half sister mom side heart murmur   • Depression Maternal Grandmother    • Depression Maternal Grandfather    • Cancer Paternal Grandfather         lung cancer             Exam:    Blood pressure 126/84, pulse 84, temperature 36.9 °C (98.4 °F), temperature source Temporal, resp. rate (!) 156, height 1.638 m (5' 4.5\"), weight 73 kg (161 lb), last menstrual period 10/11/2017, SpO2 97 %, not currently breastfeeding.  General:  Well nourished, well developed female in NAD affect and mood within normal limits.  Not tearful or despondent  Head is grossly normal.  Neck: Supple without adenopathy  Pulmonary: Clear to ausculation.  Normal effort. No rales, rhonchi, or wheezing.  Cardiovascular: Regular rate and rhythm without murmur.   Extremities: no clubbing, cyanosis, or edema.  Neuro: moves all extremities symmetrically    Please note that this dictation was created using voice recognition software. I have made every reasonable attempt to correct obvious errors, but I expect that there are errors of grammar and possibly content that I did not discover before finalizing the note.    Assessment/Plan:  1. Need for vaccination      - Influenza Vaccine Quad Injection >3Y (PF)    2. Mild episode of recurrent major depressive disorder (HCC)    -Patient was advised she could increase her Zoloft to 75 mg/day (1-1/2 tabs of 50 mg).  She would also like to try seeing a different counselor, she has been in therapy for about a year without significant benefit she feels.  -I also feel it when she returns to her normal level of activity this will be helpful to " her.  - REFERRAL TO BEHAVIORAL HEALTH    3. Dysplasia of cervix, high grade STEPHANIE 2    Status post hysterectomy, will have serial follow-up by her gynecologist    Followup: Patient will let me know how she is feeling with the increased dose of Zoloft, or when she needs refills.

## 2019-01-08 DIAGNOSIS — F33.1 MODERATE EPISODE OF RECURRENT MAJOR DEPRESSIVE DISORDER (HCC): ICD-10-CM

## 2019-03-18 DIAGNOSIS — F33.1 MODERATE EPISODE OF RECURRENT MAJOR DEPRESSIVE DISORDER (HCC): ICD-10-CM

## 2019-06-06 ENCOUNTER — APPOINTMENT (RX ONLY)
Dept: URBAN - METROPOLITAN AREA CLINIC 4 | Facility: CLINIC | Age: 34
Setting detail: DERMATOLOGY
End: 2019-06-06

## 2019-06-06 DIAGNOSIS — D22 MELANOCYTIC NEVI: ICD-10-CM

## 2019-06-06 DIAGNOSIS — L73.9 FOLLICULAR DISORDER, UNSPECIFIED: ICD-10-CM

## 2019-06-06 DIAGNOSIS — L738 OTHER SPECIFIED DISEASES OF HAIR AND HAIR FOLLICLES: ICD-10-CM

## 2019-06-06 DIAGNOSIS — L663 OTHER SPECIFIED DISEASES OF HAIR AND HAIR FOLLICLES: ICD-10-CM

## 2019-06-06 DIAGNOSIS — L70.0 ACNE VULGARIS: ICD-10-CM

## 2019-06-06 PROBLEM — D22.5 MELANOCYTIC NEVI OF TRUNK: Status: ACTIVE | Noted: 2019-06-06

## 2019-06-06 PROBLEM — L02.222 FURUNCLE OF BACK [ANY PART, EXCEPT BUTTOCK]: Status: ACTIVE | Noted: 2019-06-06

## 2019-06-06 PROBLEM — D22.71 MELANOCYTIC NEVI OF RIGHT LOWER LIMB, INCLUDING HIP: Status: ACTIVE | Noted: 2019-06-06

## 2019-06-06 PROBLEM — D22.72 MELANOCYTIC NEVI OF LEFT LOWER LIMB, INCLUDING HIP: Status: ACTIVE | Noted: 2019-06-06

## 2019-06-06 PROCEDURE — ? TREATMENT REGIMEN

## 2019-06-06 PROCEDURE — ? PRESCRIPTION

## 2019-06-06 PROCEDURE — ? COUNSELING

## 2019-06-06 PROCEDURE — 99203 OFFICE O/P NEW LOW 30 MIN: CPT

## 2019-06-06 PROCEDURE — ? OBSERVATION

## 2019-06-06 RX ORDER — DOXYCYCLINE HYCLATE 100 MG/1
1 TABLET, COATED ORAL BID
Qty: 60 | Refills: 3 | Status: ERX | COMMUNITY
Start: 2019-06-06

## 2019-06-06 RX ORDER — CLINDAMYCIN PHOSPHATE 10 MG/ML
1 SOLUTION TOPICAL BID
Qty: 1 | Refills: 3 | Status: ERX | COMMUNITY
Start: 2019-06-06

## 2019-06-06 RX ADMIN — CLINDAMYCIN PHOSPHATE 1: 10 SOLUTION TOPICAL at 00:00

## 2019-06-06 RX ADMIN — DOXYCYCLINE HYCLATE 1: 100 TABLET, COATED ORAL at 00:00

## 2019-06-06 ASSESSMENT — LOCATION SIMPLE DESCRIPTION DERM
LOCATION SIMPLE: RIGHT POSTERIOR UPPER ARM
LOCATION SIMPLE: UPPER BACK
LOCATION SIMPLE: LEFT CHEEK
LOCATION SIMPLE: CHEST
LOCATION SIMPLE: RIGHT THIGH
LOCATION SIMPLE: RIGHT LOWER BACK
LOCATION SIMPLE: LEFT ELBOW
LOCATION SIMPLE: LEFT THIGH

## 2019-06-06 ASSESSMENT — LOCATION DETAILED DESCRIPTION DERM
LOCATION DETAILED: LEFT ANTERIOR DISTAL THIGH
LOCATION DETAILED: LEFT ELBOW
LOCATION DETAILED: UPPER STERNUM
LOCATION DETAILED: INFERIOR THORACIC SPINE
LOCATION DETAILED: RIGHT SUPERIOR MEDIAL LOWER BACK
LOCATION DETAILED: RIGHT DISTAL POSTERIOR UPPER ARM
LOCATION DETAILED: RIGHT ANTERIOR DISTAL THIGH
LOCATION DETAILED: LEFT INFERIOR MEDIAL MALAR CHEEK
LOCATION DETAILED: SUPERIOR THORACIC SPINE

## 2019-06-06 ASSESSMENT — LOCATION ZONE DERM
LOCATION ZONE: FACE
LOCATION ZONE: LEG
LOCATION ZONE: TRUNK
LOCATION ZONE: ARM

## 2019-06-06 NOTE — PROCEDURE: COUNSELING
Detail Level: Detailed
Spironolactone Counseling: Patient advised regarding risks of diarrhea, abdominal pain, hyperkalemia, birth defects (for female patients), liver toxicity and renal toxicity. The patient may need blood work to monitor liver and kidney function and potassium levels while on therapy. The patient verbalized understanding of the proper use and possible adverse effects of spironolactone.  All of the patient's questions and concerns were addressed.
Isotretinoin Pregnancy And Lactation Text: This medication is Pregnancy Category X and is considered extremely dangerous during pregnancy. It is unknown if it is excreted in breast milk.
Include Pregnancy/Lactation Warning?: No
Birth Control Pills Counseling: Birth Control Pill Counseling: I discussed with the patient the potential side effects of OCPs including but not limited to increased risk of stroke, heart attack, thrombophlebitis, deep venous thrombosis, hepatic adenomas, breast changes, GI upset, headaches, and depression.  The patient verbalized understanding of the proper use and possible adverse effects of OCPs. All of the patient's questions and concerns were addressed.
Doxycycline Pregnancy And Lactation Text: This medication is Pregnancy Category D and not consider safe during pregnancy. It is also excreted in breast milk but is considered safe for shorter treatment courses.
Azithromycin Counseling:  I discussed with the patient the risks of azithromycin including but not limited to GI upset, allergic reaction, drug rash, diarrhea, and yeast infections.
Benzoyl Peroxide Pregnancy And Lactation Text: This medication is Pregnancy Category C. It is unknown if benzoyl peroxide is excreted in breast milk.
Spironolactone Pregnancy And Lactation Text: This medication can cause feminization of the male fetus and should be avoided during pregnancy. The active metabolite is also found in breast milk.
High Dose Vitamin A Counseling: Side effects reviewed, pt to contact office should one occur.
Topical Clindamycin Counseling: Patient counseled that this medication may cause skin irritation or allergic reactions.  In the event of skin irritation, the patient was advised to reduce the amount of the drug applied or use it less frequently.   The patient verbalized understanding of the proper use and possible adverse effects of clindamycin.  All of the patient's questions and concerns were addressed.
Birth Control Pills Pregnancy And Lactation Text: This medication should be avoided if pregnant and for the first 30 days post-partum.
Topical Clindamycin Pregnancy And Lactation Text: This medication is Pregnancy Category B and is considered safe during pregnancy. It is unknown if it is excreted in breast milk.
High Dose Vitamin A Pregnancy And Lactation Text: High dose vitamin A therapy is contraindicated during pregnancy and breast feeding.
Erythromycin Counseling:  I discussed with the patient the risks of erythromycin including but not limited to GI upset, allergic reaction, drug rash, diarrhea, increase in liver enzymes, and yeast infections.
Topical Retinoid counseling:  Patient advised to apply a pea-sized amount only at bedtime and wait 30 minutes after washing their face before applying.  If too drying, patient may add a non-comedogenic moisturizer. The patient verbalized understanding of the proper use and possible adverse effects of retinoids.  All of the patient's questions and concerns were addressed.
Azithromycin Pregnancy And Lactation Text: This medication is considered safe during pregnancy and is also secreted in breast milk.
Tetracycline Counseling: Patient counseled regarding possible photosensitivity and increased risk for sunburn.  Patient instructed to avoid sunlight, if possible.  When exposed to sunlight, patients should wear protective clothing, sunglasses, and sunscreen.  The patient was instructed to call the office immediately if the following severe adverse effects occur:  hearing changes, easy bruising/bleeding, severe headache, or vision changes.  The patient verbalized understanding of the proper use and possible adverse effects of tetracycline.  All of the patient's questions and concerns were addressed. Patient understands to avoid pregnancy while on therapy due to potential birth defects.
Dapsone Counseling: I discussed with the patient the risks of dapsone including but not limited to hemolytic anemia, agranulocytosis, rashes, methemoglobinemia, kidney failure, peripheral neuropathy, headaches, GI upset, and liver toxicity.  Patients who start dapsone require monitoring including baseline LFTs and weekly CBCs for the first month, then every month thereafter.  The patient verbalized understanding of the proper use and possible adverse effects of dapsone.  All of the patient's questions and concerns were addressed.
Minocycline Counseling: Patient advised regarding possible photosensitivity and discoloration of the teeth, skin, lips, tongue and gums.  Patient instructed to avoid sunlight, if possible.  When exposed to sunlight, patients should wear protective clothing, sunglasses, and sunscreen.  The patient was instructed to call the office immediately if the following severe adverse effects occur:  hearing changes, easy bruising/bleeding, severe headache, or vision changes.  The patient verbalized understanding of the proper use and possible adverse effects of minocycline.  All of the patient's questions and concerns were addressed.
Topical Sulfur Applications Counseling: Topical Sulfur Counseling: Patient counseled that this medication may cause skin irritation or allergic reactions.  In the event of skin irritation, the patient was advised to reduce the amount of the drug applied or use it less frequently.   The patient verbalized understanding of the proper use and possible adverse effects of topical sulfur application.  All of the patient's questions and concerns were addressed.
Topical Retinoid Pregnancy And Lactation Text: This medication is Pregnancy Category C. It is unknown if this medication is excreted in breast milk.
Erythromycin Pregnancy And Lactation Text: This medication is Pregnancy Category B and is considered safe during pregnancy. It is also excreted in breast milk.
Bactrim Counseling:  I discussed with the patient the risks of sulfa antibiotics including but not limited to GI upset, allergic reaction, drug rash, diarrhea, dizziness, photosensitivity, and yeast infections.  Rarely, more serious reactions can occur including but not limited to aplastic anemia, agranulocytosis, methemoglobinemia, blood dyscrasias, liver or kidney failure, lung infiltrates or desquamative/blistering drug rashes.
Dapsone Pregnancy And Lactation Text: This medication is Pregnancy Category C and is not considered safe during pregnancy or breast feeding.
Bactrim Pregnancy And Lactation Text: This medication is Pregnancy Category D and is known to cause fetal risk.  It is also excreted in breast milk.
Isotretinoin Counseling: Patient should get monthly blood tests, not donate blood, not drive at night if vision affected, not share medication, and not undergo elective surgery for 6 months after tx completed. Side effects reviewed, pt to contact office should one occur.
Tazorac Counseling:  Patient advised that medication is irritating and drying.  Patient may need to apply sparingly and wash off after an hour before eventually leaving it on overnight.  The patient verbalized understanding of the proper use and possible adverse effects of tazorac.  All of the patient's questions and concerns were addressed.
Tetracycline Pregnancy And Lactation Text: This medication is Pregnancy Category D and not consider safe during pregnancy. It is also excreted in breast milk.
Topical Sulfur Applications Pregnancy And Lactation Text: This medication is Pregnancy Category C and has an unknown safety profile during pregnancy. It is unknown if this topical medication is excreted in breast milk.
Doxycycline Counseling:  Patient counseled regarding possible photosensitivity and increased risk for sunburn.  Patient instructed to avoid sunlight, if possible.  When exposed to sunlight, patients should wear protective clothing, sunglasses, and sunscreen.  The patient was instructed to call the office immediately if the following severe adverse effects occur:  hearing changes, easy bruising/bleeding, severe headache, or vision changes.  The patient verbalized understanding of the proper use and possible adverse effects of doxycycline.  All of the patient's questions and concerns were addressed.
Detail Level: Zone
Tazorac Pregnancy And Lactation Text: This medication is not safe during pregnancy. It is unknown if this medication is excreted in breast milk.
Benzoyl Peroxide Counseling: Patient counseled that medicine may cause skin irritation and bleach clothing.  In the event of skin irritation, the patient was advised to reduce the amount of the drug applied or use it less frequently.   The patient verbalized understanding of the proper use and possible adverse effects of benzoyl peroxide.  All of the patient's questions and concerns were addressed.

## 2019-06-06 NOTE — PROCEDURE: OBSERVATION
Morphology Per Location (Optional): Brown macule
Size Of Lesion In Cm (Optional): 0
Detail Level: Detailed

## 2019-06-06 NOTE — PROCEDURE: TREATMENT REGIMEN
Modify Regimen: Gentle cleansers like cerave hydrating or cetaphil hydrating cleanser.
Initiate Treatment: Clindamyacin and doxycycline
Detail Level: Zone
Plan: Disucssed with patient that she is to take the doxycycline with a full meal and glass of water and that she is not to lie down one hour after taking.
Otc Regimen: Probiotics

## 2019-06-26 ENCOUNTER — HOSPITAL ENCOUNTER (OUTPATIENT)
Dept: LAB | Facility: MEDICAL CENTER | Age: 34
End: 2019-06-26
Attending: OBSTETRICS & GYNECOLOGY
Payer: COMMERCIAL

## 2019-06-26 PROCEDURE — 87491 CHLMYD TRACH DNA AMP PROBE: CPT

## 2019-06-26 PROCEDURE — 87591 N.GONORRHOEAE DNA AMP PROB: CPT

## 2019-06-27 LAB
C TRACH DNA SPEC QL NAA+PROBE: NEGATIVE
N GONORRHOEA DNA SPEC QL NAA+PROBE: NEGATIVE
SPECIMEN SOURCE: NORMAL

## 2019-12-19 DIAGNOSIS — F33.1 MODERATE EPISODE OF RECURRENT MAJOR DEPRESSIVE DISORDER (HCC): ICD-10-CM

## 2019-12-19 NOTE — PROGRESS NOTES
Patient requests zoloft refill. Has upcoming appt to [facility] care.

## 2020-01-22 ENCOUNTER — OFFICE VISIT (OUTPATIENT)
Dept: MEDICAL GROUP | Facility: MEDICAL CENTER | Age: 35
End: 2020-01-22
Payer: COMMERCIAL

## 2020-01-22 VITALS
OXYGEN SATURATION: 93 % | DIASTOLIC BLOOD PRESSURE: 68 MMHG | SYSTOLIC BLOOD PRESSURE: 100 MMHG | RESPIRATION RATE: 14 BRPM | TEMPERATURE: 97.9 F | HEIGHT: 65 IN | WEIGHT: 158.84 LBS | BODY MASS INDEX: 26.46 KG/M2 | HEART RATE: 89 BPM

## 2020-01-22 DIAGNOSIS — F33.1 MODERATE EPISODE OF RECURRENT MAJOR DEPRESSIVE DISORDER (HCC): ICD-10-CM

## 2020-01-22 DIAGNOSIS — N87.1 DYSPLASIA OF CERVIX, HIGH GRADE CIN 2: ICD-10-CM

## 2020-01-22 DIAGNOSIS — Z23 NEED FOR VACCINATION: ICD-10-CM

## 2020-01-22 PROCEDURE — 99203 OFFICE O/P NEW LOW 30 MIN: CPT | Mod: 25 | Performed by: FAMILY MEDICINE

## 2020-01-22 PROCEDURE — 90471 IMMUNIZATION ADMIN: CPT | Performed by: FAMILY MEDICINE

## 2020-01-22 PROCEDURE — 90686 IIV4 VACC NO PRSV 0.5 ML IM: CPT | Performed by: FAMILY MEDICINE

## 2020-01-22 RX ORDER — BUPROPION HYDROCHLORIDE 150 MG/1
150 TABLET ORAL EVERY MORNING
Qty: 30 TAB | Refills: 4 | Status: SHIPPED | OUTPATIENT
Start: 2020-01-22 | End: 2020-02-26 | Stop reason: SDUPTHER

## 2020-01-22 ASSESSMENT — PATIENT HEALTH QUESTIONNAIRE - PHQ9
7. TROUBLE CONCENTRATING ON THINGS, SUCH AS READING THE NEWSPAPER OR WATCHING TELEVISION: NOT AT ALL
3. TROUBLE FALLING OR STAYING ASLEEP OR SLEEPING TOO MUCH: NEARLY EVERY DAY
2. FEELING DOWN, DEPRESSED, IRRITABLE, OR HOPELESS: SEVERAL DAYS
1. LITTLE INTEREST OR PLEASURE IN DOING THINGS: SEVERAL DAYS
9. THOUGHTS THAT YOU WOULD BE BETTER OFF DEAD, OR OF HURTING YOURSELF: NOT AT ALL
8. MOVING OR SPEAKING SO SLOWLY THAT OTHER PEOPLE COULD HAVE NOTICED. OR THE OPPOSITE, BEING SO FIGETY OR RESTLESS THAT YOU HAVE BEEN MOVING AROUND A LOT MORE THAN USUAL: NOT AT ALL
4. FEELING TIRED OR HAVING LITTLE ENERGY: NEARLY EVERY DAY
SUM OF ALL RESPONSES TO PHQ QUESTIONS 1-9: 11
6. FEELING BAD ABOUT YOURSELF - OR THAT YOU ARE A FAILURE OR HAVE LET YOURSELF OR YOUR FAMILY DOWN: SEVERAL DAYS
SUM OF ALL RESPONSES TO PHQ9 QUESTIONS 1 AND 2: 2
5. POOR APPETITE OR OVEREATING: MORE THAN HALF THE DAYS

## 2020-01-22 NOTE — ASSESSMENT & PLAN NOTE
This is a chronic problem.  The patient reports she has done fairly well on Zoloft 75 mg daily however continues to have a depressed mood more often than not, decreased motivation, persistent fatigue and decreased interest. She is seeing a counselor every other week, exercising for 30 min to an hour 5-6 times weekly, eating a healthy diet, and getting 8-9 hours of sleep per night. She has good family support. She denies SI or thoughts of self harm. PHQ 9: 11

## 2020-01-22 NOTE — PROGRESS NOTES
Subjective:     CC:  Diagnoses of Moderate episode of recurrent major depressive disorder (HCC), Need for vaccination, and Dysplasia of cervix, high grade STEPHANIE 2 were pertinent to this visit.    HISTORY OF THE PRESENT ILLNESS: Patient is a 34 y.o. female. She is here today to establish care and discuss the following:  Prior PCP: Dr. Mcclain.    Episode of recurrent major depressive disorder (CMS-HCC) (HCC)  This is a chronic problem.  The patient reports she has done fairly well on Zoloft 75 mg daily however continues to have a depressed mood more often than not, decreased motivation, persistent fatigue and decreased interest. She is seeing a counselor every other week, exercising for 30 min to an hour 5-6 times weekly, eating a healthy diet, and getting 8-9 hours of sleep per night. She has good family support. She denies SI or thoughts of self harm. PHQ 9: 11    Dysplasia of cervix, high grade STEPHANIE 2  The patient has a history of recurrent STEPHANIE 2 and STEPHANIE 3; she is s/p hysterectomy by Dr. Dunn in 2018.    Allergies: [place] meal; Brazil nuts; Cashew nut oil; Macadamia nut oil; Other food; Pecan; Ultram [tramadol hcl]; and Zofran [ondansetron]    Current Outpatient Medications Ordered in Epic   Medication Sig Dispense Refill   • buPROPion (WELLBUTRIN XL) 150 MG XL tablet Take 1 Tab by mouth every morning. 30 Tab 4   • sertraline (ZOLOFT) 50 MG Tab Take 1.5 Tabs by mouth every day. 135 Tab 3   • ibuprofen (MOTRIN) 200 MG Tab Take 200 mg by mouth every 6 hours as needed.       No current Epic-ordered facility-administered medications on file.        Past Medical History:   Diagnosis Date   • Bronchitis 2009   • Depression 1/7/2015   • Other specified disorder of intestines    • Pneumonia     age 18   • Post-partum depression    • Sinus tachycardia     Only while pregnant       Past Surgical History:   Procedure Laterality Date   • RAFAL BY LAPAROSCOPY  8/30/2012    Performed by SHERINE BEARDEN at SUR[facility]ERY SAME DAY  [facility]   • ABDOMINAL SUBTOTAL HYSTERECTOMY     • OTHER Bilateral     Lasik surgery to correct her vision   • TONSILLECTOMY         Social History     Tobacco Use   • Smoking status: Never Smoker   • Smokeless tobacco: Never Used   Substance Use Topics   • Alcohol use: No   • Drug use: No       Social History     Patient does not qualify to have social determinant information on file (likely too young).   Social History Narrative   • Not on file       Family History   Problem Relation Age of Onset   • Alcohol/Drug Mother    • Stroke Mother    • Heart Disease Sister         half sister mom side heart murmur   • Depression Maternal Grandmother    • Depression Maternal Grandfather    • Cancer Paternal Grandfather         lung cancer       Health Maintenance: Patient received a flu shot today.  She is up-to-date on her Pap smear and other immunizations.    ROS:   Gen: no fevers/chills  Eyes: no changes in vision  ENT: no sore throat or nasal congestion  Pulm: no sob, no cough  CV: no chest pain  GI: no nausea/vomiting, no diarrhea or constipation  : no dysuria  MSk: no myalgias  Skin: no rash  Neuro: no headaches, no numbness/tingling  Heme/Lymph: no easy bruising      Objective:     Exam: /68 (BP Location: Left arm, Patient Position: Sitting, BP Cuff Size: Adult long)   Pulse 89   Temp 36.6 °C (97.9 °F) (Temporal)   Resp 14   Ht 1.651 m (5' 5\")   Wt 72 kg (158 lb 13.5 oz)   SpO2 93%  Body mass index is 26.43 kg/m².    General: Well-developed, well-nourished. Appears stated age.  HEENT: Normocephalic. Conjunctiva clear without injection or scleral icterus. Pupils equal and reactive to light. Ears normal shape and contour, canals are clear bilaterally, tympanic membranes pearly, oropharynx is clear without erythema, edema or exudates.   Neck: Supple; no obvious thyromegaly or nodules appreciated  Pulmonary: Clear to ausculation bilaterally.  No increased work of breathing. No rales, ronchi, or " wheezing.  Cardiovascular: Regular rate and rhythm without murmur.  Abdomen: Soft, nontender, nondistended. Normal bowel sounds. No guarding or rebound tenderness.  Neurologic: CN III-XII intact  Lymph: No cervical or supraclavicular lymphadenopathy  Skin: Warm and dry.  No obvious lesions.  Musculoskeletal: Normal gait. No extremity cyanosis, clubbing, or edema.  Psych: Euthymic affect. Alert and oriented x 3. Judgment and insight is normal.    Assessment & Plan:   34 y.o. female with the following -    1. Moderate episode of recurrent major depressive disorder (HCC)  This is a chronic, worsening problem. PHQ9: 11. Patient reports compliance with zoloft 75mg daily and sees a counselor every other week however continues to have symptoms. As she does experience decreased libido with zoloft she would like to add buproprion rather than increasing her dose of zoloft. No SI or safety concerns.  -Continue Zoloft 75 mg daily  -Start bupropion  mg q. Morning  -Continue counseling  -Continue daily exercise, healthy diet, good sleep hygiene, and positive social interactions    2. Need for vaccination  - Influenza Vaccine Quad Injection (PF)      Return in about 1 month (around 2/22/2020).    Please note this dictation was created using voice recognition software. I have made every reasonable attempt to correct obvious errors, but I expect there may be errors of grammar, and possibly content, that I did not discover before finalizing the note.

## 2020-01-22 NOTE — LETTER
[facility]  Rayne Joyce M.D.  [address]  [place] [address]  Fax: [phone]   Authorization for Release/Disclosure of   Protected Health Information   Name: CHRISTINE LARA : 1985 SSN: [id number]   Address: [address] [place] [address] Phone:    [phone] (home)    I authorize the entity listed below to release/disclose the PHI below to:   [facility]/Rayne Joyce M.D. and Rayne Joyce M.D.   Provider or Entity Name:     Address   City, State, Zip   Phone:      Fax:     Reason for request: continuity of care   Information to be released:    [  ] LAST COLONOSCOPY,  including any PATH REPORT and follow-up  [  ] LAST FIT/COLOGUARD RESULT [  ] LAST DEXA  [  ] LAST MAMMOGRAM  [  ] LAST PAP  [  ] LAST LABS [  ] RETINA EXAM REPORT  [  ] IMMUNIZATION RECORDS  [  ] Release all info      [  ] Check here and initial the line next to each item to release ALL health information INCLUDING  _____ Care and treatment for drug and / or alcohol abuse  _____ HIV testing, infection status, or AIDS  _____ Genetic Testing    DATES OF SERVICE OR TIME PERIOD TO BE DISCLOSED: _____________  I understand and acknowledge that:  * This Authorization may be revoked at any time by you in writing, except if your health information has already been used or disclosed.  * Your health information that will be used or disclosed as a result of you signing this authorization could be re-disclosed by the recipient. If this occurs, your re-disclosed health information may no longer be protected by State or Federal laws.  * You may refuse to sign this Authorization. Your refusal will not affect your ability to obtain treatment.  * This Authorization becomes effective upon signing and will  on (date) __________.      If no date is indicated, this Authorization will  one (1) year from the signature date.    Name: Christine Lara    Signature:   Date:     2020            PLEASE FAX REQUESTED RECORDS BACK TO: [phone]

## 2020-01-22 NOTE — ASSESSMENT & PLAN NOTE
The patient has a history of recurrent STEPHANIE 2 and STEPHANIE 3; she is s/p hysterectomy by Dr. Dunn in 2018.

## 2020-02-12 ENCOUNTER — HOSPITAL ENCOUNTER (OUTPATIENT)
Dept: HOSPITAL 8 - WOUND | Age: 35
Discharge: HOME | End: 2020-02-12
Attending: INTERNAL MEDICINE
Payer: COMMERCIAL

## 2020-02-12 DIAGNOSIS — F41.9: ICD-10-CM
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
## 2020-02-12 DIAGNOSIS — Z88.8: ICD-10-CM

## 2020-02-12 DIAGNOSIS — Z88.6: ICD-10-CM

## 2020-02-12 DIAGNOSIS — G43.909: ICD-10-CM

## 2020-02-12 DIAGNOSIS — Z98.890: ICD-10-CM

## 2020-02-12 DIAGNOSIS — Z87.01: ICD-10-CM

## 2020-02-12 DIAGNOSIS — T81.32XA: Primary | ICD-10-CM

## 2020-02-12 DIAGNOSIS — L97.422: ICD-10-CM

## 2020-02-12 DIAGNOSIS — Z90.49: ICD-10-CM

## 2020-02-12 DIAGNOSIS — F32.9: ICD-10-CM
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
## 2020-02-12 PROCEDURE — 97597 DBRDMT OPN WND 1ST 20 CM/<: CPT

## 2020-02-12 PROCEDURE — 99215 OFFICE O/P EST HI 40 MIN: CPT

## 2020-02-19 ENCOUNTER — HOSPITAL ENCOUNTER (OUTPATIENT)
Dept: HOSPITAL 8 - WOUND | Age: 35
Discharge: HOME | End: 2020-02-19
Attending: INTERNAL MEDICINE
Payer: COMMERCIAL

## 2020-02-19 DIAGNOSIS — Z88.8: ICD-10-CM

## 2020-02-19 DIAGNOSIS — Z88.6: ICD-10-CM

## 2020-02-19 DIAGNOSIS — T81.32XD: Primary | ICD-10-CM

## 2020-02-19 DIAGNOSIS — F32.9: ICD-10-CM

## 2020-02-19 DIAGNOSIS — Z90.49: ICD-10-CM

## 2020-02-19 DIAGNOSIS — F41.9: ICD-10-CM
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
## 2020-02-19 DIAGNOSIS — L97.422: ICD-10-CM

## 2020-02-19 DIAGNOSIS — Z98.890: ICD-10-CM

## 2020-02-19 DIAGNOSIS — G43.909: ICD-10-CM

## 2020-02-19 DIAGNOSIS — Z87.01: ICD-10-CM

## 2020-02-19 PROCEDURE — 97597 DBRDMT OPN WND 1ST 20 CM/<: CPT

## 2020-02-26 ENCOUNTER — OFFICE VISIT (OUTPATIENT)
Dept: MEDICAL GROUP | Facility: MEDICAL CENTER | Age: 35
End: 2020-02-26
Payer: COMMERCIAL

## 2020-02-26 VITALS
HEART RATE: 89 BPM | HEIGHT: 65 IN | RESPIRATION RATE: 16 BRPM | BODY MASS INDEX: 26.86 KG/M2 | TEMPERATURE: 98.1 F | OXYGEN SATURATION: 98 % | DIASTOLIC BLOOD PRESSURE: 64 MMHG | WEIGHT: 161.2 LBS | SYSTOLIC BLOOD PRESSURE: 94 MMHG

## 2020-02-26 DIAGNOSIS — F33.1 MODERATE EPISODE OF RECURRENT MAJOR DEPRESSIVE DISORDER (HCC): ICD-10-CM

## 2020-02-26 PROCEDURE — 99213 OFFICE O/P EST LOW 20 MIN: CPT | Performed by: FAMILY MEDICINE

## 2020-02-26 RX ORDER — BUPROPION HYDROCHLORIDE 150 MG/1
300 TABLET ORAL EVERY MORNING
Qty: 60 TAB | Refills: 5 | Status: SHIPPED | OUTPATIENT
Start: 2020-02-26 | End: 2020-05-26 | Stop reason: SDUPTHER

## 2020-02-26 NOTE — PROGRESS NOTES
Subjective:     CC: f/u depression    HPI:   Nicolasa presents today with:    Episode of recurrent major depressive disorder (CMS-HCC) (HCC)  This is a chronic problem.  She has been on zoloft 75mg for over a year however had persistent depressed mood, decreased motivation, and persistent fatigue. We started her on Wellbutrin XL 150mg daily about a month ago. She reports her symptoms have improved since starting the medication however continues to have days she doesn't want to get out of bed. We discussed increasing her wellbutrin vs. Increasing her zoloft. As her libido has improved s/p starting Wellbutrin, she would like to increase this medication before increasing zoloft. She continues to see a counselor every other week, exercises for 30 min to an hour 5-6 times weekly, eats a healthy diet, and gets 8-9 hours of sleep per night. She has good family support. She denies SI or thoughts of self harm.       Past Medical History:   Diagnosis Date   • Bronchitis 2009   • Depression 1/7/2015   • Other specified disorder of intestines    • Pneumonia     age 18   • Post-partum depression    • Sinus tachycardia     Only while pregnant       Social History     Tobacco Use   • Smoking status: Never Smoker   • Smokeless tobacco: Never Used   Substance Use Topics   • Alcohol use: No   • Drug use: No       Current Outpatient Medications Ordered in Epic   Medication Sig Dispense Refill   • buPROPion (WELLBUTRIN XL) 150 MG XL tablet Take 2 Tabs by mouth every morning. 60 Tab 5   • sertraline (ZOLOFT) 50 MG Tab Take 1.5 Tabs by mouth every day. 135 Tab 3   • ibuprofen (MOTRIN) 200 MG Tab Take 200 mg by mouth every 6 hours as needed.       No current Epic-ordered facility-administered medications on file.        Allergies:  [place] meal; [place] nuts; Cashew nut oil; Macadamia nut oil; Other food; Pecan; Ultram [tramadol hcl]; and Zofran [ondansetron]    Health Maintenance: The patient is UTD    ROS:  Gen: no fevers/chills, no  "changes in weight  Eyes: no changes in vision  ENT: no sore throat, no hearing loss, no bloody nose  Pulm: no SOB  CV: no chest pain  GI: no nausea/vomiting, no diarrhea  : no dysuria  MSk: no myalgias  Skin: no rash  Neuro: no headaches, no numbness/tingling  Heme/Lymph: no easy bruising      Objective:       Exam:  BP (!) 94/64 (BP Location: Left arm, Patient Position: Sitting, BP Cuff Size: Adult long)   Pulse 89   Temp 36.7 °C (98.1 °F) (Temporal)   Resp 16   Ht 1.651 m (5' 5\")   Wt 73.1 kg (161 lb 3.2 oz)   LMP 10/11/2017   SpO2 98%   BMI 26.83 kg/m²  Body mass index is 26.83 kg/m².    Gen: Alert and oriented, No apparent distress  Neck: Neck is supple without lymphadenopathy  Lungs: Normal effort, CTA bilaterally, no wheezes, rhonchi, or rales  CV: Regular rate and rhythm, no murmurs, rubs, or gallops      Assessment & Plan:     34 y.o. female with the following -     1. Moderate episode of recurrent major depressive disorder (HCC)  This is a chronic problem.  Patient has been on Zoloft 75 mg for over a year.  She had persistent symptoms and was thus started on Wellbutrin  mg daily about a month ago.  She reports her symptoms have improved however she still has states she does not want to get out of bed.  -Continue Zoloft 75 mg daily  -Increase bupropion XL to 300mg q. morning  -Continue counseling  -Continue daily exercise, healthy diet, good sleep hygiene, and positive social interactions      Return in about 3 months (around 5/26/2020).    Please note this dictation was created using voice recognition software. I have made every reasonable attempt to correct obvious errors, but I expect there may be errors of grammar, and possibly content, that I did not discover before finalizing the note.       "

## 2020-02-26 NOTE — ASSESSMENT & PLAN NOTE
This is a chronic problem.  She has been on zoloft 75mg for over a year however had persistent depressed mood, decreased motivation, and persistent fatigue. We started her on Wellbutrin XL 150mg daily about a month ago. She reports her symptoms have improved since starting the medication however continues to have days she doesn't want to get out of bed. We discussed increasing her wellbutrin vs. Increasing her zoloft. As her libido has improved s/p starting Wellbutrin, she would like to increase this medication before increasing zoloft. She continues to see a counselor every other week, exercises for 30 min to an hour 5-6 times weekly, eats a healthy diet, and gets 8-9 hours of sleep per night. She has good family support. She denies SI or thoughts of self harm.    verbalization

## 2020-03-11 ENCOUNTER — HOSPITAL ENCOUNTER (OUTPATIENT)
Dept: HOSPITAL 8 - WOUND | Age: 35
Discharge: HOME | End: 2020-03-11
Attending: INTERNAL MEDICINE
Payer: COMMERCIAL

## 2020-03-11 DIAGNOSIS — F32.9: ICD-10-CM

## 2020-03-11 DIAGNOSIS — G43.909: ICD-10-CM

## 2020-03-11 DIAGNOSIS — T81.32XD: Primary | ICD-10-CM

## 2020-03-11 DIAGNOSIS — L97.422: ICD-10-CM

## 2020-03-11 DIAGNOSIS — Z90.710: ICD-10-CM

## 2020-03-11 DIAGNOSIS — F41.9: ICD-10-CM

## 2020-03-11 DIAGNOSIS — Z87.01: ICD-10-CM

## 2020-03-11 DIAGNOSIS — Z88.8: ICD-10-CM

## 2020-03-11 DIAGNOSIS — Z90.49: ICD-10-CM

## 2020-03-11 DIAGNOSIS — Z88.6: ICD-10-CM

## 2020-03-11 DIAGNOSIS — Z98.890: ICD-10-CM

## 2020-03-11 DIAGNOSIS — Z90.89: ICD-10-CM
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
## 2020-03-11 PROCEDURE — 97597 DBRDMT OPN WND 1ST 20 CM/<: CPT

## 2020-05-26 ENCOUNTER — TELEMEDICINE (OUTPATIENT)
Dept: MEDICAL GROUP | Facility: MEDICAL CENTER | Age: 35
End: 2020-05-26
Payer: COMMERCIAL

## 2020-05-26 VITALS — HEART RATE: 72 BPM | HEIGHT: 65 IN | WEIGHT: 155 LBS | TEMPERATURE: 98.2 F | BODY MASS INDEX: 25.83 KG/M2

## 2020-05-26 DIAGNOSIS — F33.42 RECURRENT MAJOR DEPRESSIVE DISORDER, IN FULL REMISSION (HCC): ICD-10-CM

## 2020-05-26 PROBLEM — N87.1 DYSPLASIA OF CERVIX, HIGH GRADE CIN 2: Status: RESOLVED | Noted: 2018-11-06 | Resolved: 2020-05-26

## 2020-05-26 PROBLEM — F33.9 EPISODE OF RECURRENT MAJOR DEPRESSIVE DISORDER (HCC): Status: RESOLVED | Noted: 2017-10-12 | Resolved: 2020-05-26

## 2020-05-26 PROCEDURE — 99213 OFFICE O/P EST LOW 20 MIN: CPT | Mod: 95,CR | Performed by: FAMILY MEDICINE

## 2020-05-26 RX ORDER — BUPROPION HYDROCHLORIDE 150 MG/1
300 TABLET ORAL EVERY MORNING
Qty: 180 TAB | Refills: 3 | Status: SHIPPED | OUTPATIENT
Start: 2020-05-26 | End: 2020-11-03

## 2020-05-26 NOTE — ASSESSMENT & PLAN NOTE
"This is a chronic problem.  The patient was initially on Zoloft 75 mg daily however experienced persistent symptoms.  Wellbutrin was subsequently added to her regimen.  She is currently on Wellbutrin  mg q. morning.  She reports she feels \"fantastic\", has increased energy and motivation, and her libido has also increased. She continues to see a counselor every other week, exercises for 30 min to an hour 5-6 times weekly, eats a healthy diet, and gets 8-9 hours of sleep per night. She has good family support. She denies SI or thoughts of self harm.   "

## 2020-05-26 NOTE — PROGRESS NOTES
"Telemedicine Visit: Established Patient     This encounter was conducted via [employer]   Verbal consent was obtained. Patient's identity was verified.    Subjective:   CC:   Nicolasa Magaña is a 35 y.o. female presenting for evaluation and management of:    Recurrent major depressive disorder, in full remission (HCC)  This is a chronic problem.  The patient was initially on Zoloft 75 mg daily however experienced persistent symptoms.  Wellbutrin was subsequently added to her regimen.  She is currently on Wellbutrin  mg q. morning.  She reports she feels \"fantastic\", has increased energy and motivation, and her libido has also increased. She continues to see a counselor every other week, exercises for 30 min to an hour 5-6 times weekly, eats a healthy diet, and gets 8-9 hours of sleep per night. She has good family support. She denies SI or thoughts of self harm.       ROS   Denies any recent fevers or chills. No nausea or vomiting. No chest pains or shortness of breath.     Allergies   Allergen Reactions   • [place] Meal      Canker sores   • [place] Nuts      Canker sores   • Cashew Nut Oil      Canker sores   • Macadamia Nut Oil      Canker sores   • Other Food      Walnuts cause throat swelling but not anaphylactic. Chestnuts cause canker sores   • Pecan      Canker sores   • Ultram [Tramadol Hcl]      Nausea and dizziness.   • Zofran [Ondansetron] Unspecified     migraine       Current medicines (including changes today)  Current Outpatient Medications   Medication Sig Dispense Refill   • buPROPion (WELLBUTRIN XL) 150 MG XL tablet Take 2 Tabs by mouth every morning. 180 Tab 3   • sertraline (ZOLOFT) 50 MG Tab Take 1.5 Tabs by mouth every day for 90 days. 135 Tab 3   • ibuprofen (MOTRIN) 200 MG Tab Take 200 mg by mouth every 6 hours as needed.       No current facility-administered medications for this visit.        Patient Active Problem List    Diagnosis Date Noted   • Recurrent major depressive " "disorder, in full remission (HCC) 10/12/2017       Family History   Problem Relation Age of Onset   • Alcohol/Drug Mother    • Stroke Mother    • Heart Disease Sister         half sister mom side heart murmur   • Depression Maternal Grandmother    • Depression Maternal Grandfather    • Cancer Paternal Grandfather         lung cancer       She  has a past medical history of Bronchitis (2009), Depression (1/7/2015), Dysplasia of cervix, high grade STEPHANIE 2 (11/6/2018), Episode of recurrent major depressive disorder (HCC) (10/12/2017), Other specified disorder of intestines, Pneumonia, Post-partum depression, and Sinus tachycardia.  She  has a past surgical history that includes tonsillectomy; jose alejandro by laparoscopy (8/30/2012); other (Bilateral); and abdominal subtotal hysterectomy.       Objective:   Pulse 72 Comment: pt. reported  Temp 36.8 °C (98.2 °F) (Temporal) Comment: pt. reported  Ht 1.651 m (5' 5\") Comment: pt. reported  Wt 70.3 kg (155 lb) Comment: pt. reported  LMP 10/11/2017   BMI 25.79 kg/m²     Physical Exam:  Constitutional: Alert, no distress, well-groomed.  Skin: No rashes in visible areas.  Eye: Round. Conjunctiva clear, lids normal. No icterus.   ENMT: Lips pink without lesions, good dentition, moist mucous membranes. Phonation normal.  Respiratory: Unlabored respiratory effort, no cough or audible wheeze  Psych: Alert and oriented x3, normal affect and mood.       Assessment and Plan:   The following treatment plan was discussed:     1. Recurrent major depressive disorder, in full remission (HCC)  This is a chronic problem.  The patient reports she is doing very well on Zoloft 75 mg daily and Wellbutrin  mg daily in addition to regular counseling, exercise, healthy diet, good sleep hygiene.  -Continue current regimen; we will plan to follow-up in 4 months, patient will contact me via my chart should she have any recurrent symptoms and need to see me sooner    Other orders  - buPROPion " (WELLBUTRIN XL) 150 MG XL tablet; Take 2 Tabs by mouth every morning.  Dispense: 180 Tab; Refill: 3  - sertraline (ZOLOFT) 50 MG Tab; Take 1.5 Tabs by mouth every day for 90 days.  Dispense: 135 Tab; Refill: 3        Follow-up: Return in about 4 months (around 9/26/2020).

## 2020-10-22 ENCOUNTER — HOSPITAL ENCOUNTER (OUTPATIENT)
Dept: LAB | Facility: MEDICAL CENTER | Age: 35
End: 2020-10-22
Attending: ANESTHESIOLOGY
Payer: COMMERCIAL

## 2020-10-22 LAB — COVID ORDER STATUS COVID19: NORMAL

## 2020-10-22 PROCEDURE — C9803 HOPD COVID-19 SPEC COLLECT: HCPCS

## 2020-10-22 PROCEDURE — U0003 INFECTIOUS AGENT DETECTION BY NUCLEIC ACID (DNA OR RNA); SEVERE ACUTE RESPIRATORY SYNDROME CORONAVIRUS 2 (SARS-COV-2) (CORONAVIRUS DISEASE [COVID-19]), AMPLIFIED PROBE TECHNIQUE, MAKING USE OF HIGH THROUGHPUT TECHNOLOGIES AS DESCRIBED BY CMS-2020-01-R: HCPCS

## 2020-10-23 LAB
SARS-COV-2 RNA RESP QL NAA+PROBE: NOTDETECTED
SPECIMEN SOURCE: NORMAL

## 2020-11-03 RX ORDER — BUPROPION HYDROCHLORIDE 150 MG/1
TABLET ORAL
Qty: 60 TAB | Refills: 5 | Status: SHIPPED
Start: 2020-11-03 | End: 2021-10-13

## 2021-05-26 ENCOUNTER — HOSPITAL ENCOUNTER (OUTPATIENT)
Dept: LAB | Facility: MEDICAL CENTER | Age: 36
End: 2021-05-26
Attending: OBSTETRICS & GYNECOLOGY
Payer: COMMERCIAL

## 2021-05-26 PROCEDURE — 88175 CYTOPATH C/V AUTO FLUID REDO: CPT

## 2021-05-26 PROCEDURE — 87624 HPV HI-RISK TYP POOLED RSLT: CPT

## 2021-05-28 LAB
CYTOLOGY REG CYTOL: NORMAL
HPV HR 12 DNA CVX QL NAA+PROBE: NEGATIVE
HPV16 DNA SPEC QL NAA+PROBE: NEGATIVE
HPV18 DNA SPEC QL NAA+PROBE: NEGATIVE
SPECIMEN SOURCE: NORMAL

## 2021-10-13 ENCOUNTER — OFFICE VISIT (OUTPATIENT)
Dept: MEDICAL GROUP | Facility: MEDICAL CENTER | Age: 36
End: 2021-10-13
Payer: COMMERCIAL

## 2021-10-13 VITALS
BODY MASS INDEX: 29.05 KG/M2 | HEIGHT: 65 IN | RESPIRATION RATE: 16 BRPM | WEIGHT: 174.38 LBS | OXYGEN SATURATION: 95 % | HEART RATE: 88 BPM | TEMPERATURE: 97.6 F | DIASTOLIC BLOOD PRESSURE: 62 MMHG | SYSTOLIC BLOOD PRESSURE: 102 MMHG

## 2021-10-13 DIAGNOSIS — Z00.00 ENCOUNTER FOR SCREENING AND PREVENTATIVE CARE: ICD-10-CM

## 2021-10-13 DIAGNOSIS — F33.42 RECURRENT MAJOR DEPRESSIVE DISORDER, IN FULL REMISSION (HCC): ICD-10-CM

## 2021-10-13 DIAGNOSIS — R53.83 FATIGUE, UNSPECIFIED TYPE: ICD-10-CM

## 2021-10-13 DIAGNOSIS — Z11.59 NEED FOR HEPATITIS C SCREENING TEST: ICD-10-CM

## 2021-10-13 DIAGNOSIS — R63.5 WEIGHT GAIN: ICD-10-CM

## 2021-10-13 PROCEDURE — 99395 PREV VISIT EST AGE 18-39: CPT | Performed by: FAMILY MEDICINE

## 2021-10-13 RX ORDER — HYDROXYZINE HYDROCHLORIDE 10 MG/1
10 TABLET, FILM COATED ORAL 3 TIMES DAILY PRN
Qty: 30 TABLET | Refills: 0 | Status: SHIPPED
Start: 2021-10-13 | End: 2021-12-01

## 2021-10-13 RX ORDER — BUPROPION HYDROCHLORIDE 300 MG/1
300 TABLET ORAL EVERY MORNING
Qty: 90 TABLET | Refills: 3 | Status: SHIPPED
Start: 2021-10-13 | End: 2022-09-14

## 2021-10-13 ASSESSMENT — PATIENT HEALTH QUESTIONNAIRE - PHQ9
SUM OF ALL RESPONSES TO PHQ QUESTIONS 1-9: 7
5. POOR APPETITE OR OVEREATING: 2 - MORE THAN HALF THE DAYS
CLINICAL INTERPRETATION OF PHQ2 SCORE: 1

## 2021-10-13 NOTE — PROGRESS NOTES
Subjective:     CC:   Chief Complaint   Patient presents with   • Annual Exam       HPI:   Nicolasa Magaña is a 36 y.o. female who presents for annual exam. She is feeling well and denies any complaints at this time.    Ob-Gyn/ History:    Patient has GYN provider: [facility], Dr. Grover  /Para:    Last Pap Smear:  2021.  history of abnormal pap smears - s/p hysterecomy for CINIIII 2018, she follows with [facility] for yearly paps  Gyn Surgery:  Hysterectomy 2018.  She is currently sexually active; no pain or concerns regarding sex  Urinary incontinence: None    Health Maintenance  Osteoporosis Screen/ DEXA: Plan for age 66yo   PT/vit D for falls prevention: Patient reports diet adequate in vit D, daily weight bearing exercise   Cholesterol Screening: Ordered  Diabetes Screening: Ordered   Aspirin Use: NA    Diet: Héctor reports very healthy diet, working with nutritionist   Exercise: Daily exercise   Substance Abuse: None  Safe in relationship.   Seat belts, bike helmet, gun safety discussed.  Sun protection used.    Cancer screening  Colorectal Cancer Screening: Plan for age 46yo    Lung Cancer Screening: NA    Cervical Cancer Screening: UTD   Breast Cancer Screening: Plan for age 41yo     Infectious disease screening/Immunizations  --STI Screening: Offered   --Practices safe sex.  --HIV Screening: offered   --Hepatitis C Screening: ordered   --Immunizations: Patient due for second COVID19 vaccination today, flu deferred     She  has a past medical history of Bronchitis (), Depression (2015), Dysplasia of cervix, high grade STEPHANIE 2 (2018), Episode of recurrent major depressive disorder (HCC) (10/12/2017), Other specified disorder of intestines, Pneumonia, Post-partum depression, and Sinus tachycardia.  She  has a past surgical history that includes tonsillectomy; jose alejandro by laparoscopy (2012); other (Bilateral); and abdominal subtotal hysterectomy.    Family History   Problem Relation Age  of Onset   • Alcohol/Drug Mother    • Stroke Mother    • Heart Disease Sister         half sister mom side heart murmur   • Depression Maternal Grandmother    • Depression Maternal Grandfather    • Cancer Paternal Grandfather         lung cancer       Social History     Socioeconomic History   • Marital status:      Spouse name: Not on file   • Number of children: Not on file   • Years of education: Not on file   • Highest education level: Not on file   Occupational History   • Not on file   Tobacco Use   • Smoking status: Never Smoker   • Smokeless tobacco: Never Used   Vaping Use   • Vaping Use: Never used   Substance and Sexual Activity   • Alcohol use: No   • Drug use: No   • Sexual activity: Yes     Partners: Male     Birth control/protection: Surgical   Other Topics Concern   • Not on file   Social History Narrative   • Not on file     Social Determinants of Health     Financial Resource Strain:    • Difficulty of Paying Living Expenses:    Food Insecurity:    • Worried About Running Out of Food in the Last Year:    • Ran Out of Food in the Last Year:    Transportation Needs:    • Lack of Transportation (Medical):    • Lack of Transportation (Non-Medical):    Physical Activity:    • Days of Exercise per Week:    • Minutes of Exercise per Session:    Stress:    • Feeling of Stress :    Social Connections:    • Frequency of Communication with Friends and Family:    • Frequency of Social Gatherings with Friends and Family:    • Attends [employer] Services:    • Active Member of Clubs or Organizations:    • Attends Club or Organization Meetings:    • Marital Status:    Intimate Partner Violence:    • Fear of Current or Ex-Partner:    • Emotionally Abused:    • Physically Abused:    • Sexually Abused:        Patient Active Problem List    Diagnosis Date Noted   • Recurrent major depressive disorder, in full remission ([facility]) 10/12/2017         Current Outpatient Medications   Medication Sig Dispense Refill   •  "Multiple Vitamins-Minerals (ONE-A-DAY WOMENS PO) Take  by mouth.     • hydrOXYzine HCl (ATARAX) 10 MG Tab Take 1 Tablet by mouth 3 times a day as needed for Anxiety. 30 Tablet 0   • buPROPion (WELLBUTRIN XL) 300 MG XL tablet Take 1 Tablet by mouth every morning. 90 Tablet 3   • sertraline (ZOLOFT) 50 MG Tab TAKE 1 & 1/2 TABLET BY MOUTH ONCE DAILY 135 Tablet 3     No current facility-administered medications for this visit.     Allergies   Allergen Reactions   • [place] Meal      Canker sores   • [place] Nuts      Canker sores   • Cashew Nut Oil      Canker sores   • Macadamia Nut Oil      Canker sores   • Other Food      Walnuts cause throat swelling but not anaphylactic. Chestnuts cause canker sores   • Pecan      Canker sores   • Ultram [Tramadol Hcl]      Nausea and dizziness.   • Zofran [Ondansetron] Unspecified     migraine       Review of Systems   Constitutional: Negative for fever and chills  HENT: Negative for congestion.    Eyes: Negative for blurry vision.  Respiratory: Negative for cough and shortness of breath.    Cardiovascular: Negative for chest pain and leg swelling.   Gastrointestinal: Negative for nausea, vomiting, abdominal pain and diarrhea.   Genitourinary: Negative for dysuria and hematuria.   Skin: Negative for rash.   Neurological: Negative for dizziness, focal weakness and headaches.   Heme: Does not bruise/bleed easily.   Psychiatric/Behavioral: Negative for depression.  The patient is not nervous/anxious.      Objective:     /62 (BP Location: Left arm, Patient Position: Sitting)   Pulse 88   Temp 36.4 °C (97.6 °F) (Temporal)   Resp 16   Ht 1.651 m (5' 5\")   Wt 79.1 kg (174 lb 6.1 oz)   LMP 10/11/2017 (LMP Unknown)   SpO2 95%   BMI 29.02 kg/m²   Body mass index is 29.02 kg/m².  Wt Readings from Last 4 Encounters:   10/13/21 79.1 kg (174 lb 6.1 oz)   05/26/20 70.3 kg (155 lb)   02/26/20 73.1 kg (161 lb 3.2 oz)   01/22/20 72 kg (158 lb 13.5 oz)       Physical " Exam:  Constitutional: Well-developed, well-nourished. Appears staged age.   Skin: Skin is warm and dry. No rash noted.  Head: Atraumatic without obvious lesions.  Eyes: Conjunctivae and extraocular motions intact. Pupils are equal, round, and reactive to light. No scleral icterus.   Ears:  External ears unremarkable. Tympanic membranes clear and intact.   Neck: Supple, trachea midline. Normal range of motion. No thyromegaly present. No lymphadenopathy--cervical or supraclavicular.  Cardiovascular: Regular rate and rhythm, S1 and S2 without murmur, rubs, or gallops.  Lungs: Normal inspiratory effort, CTA bilaterally, no wheezes/rhonchi/rales  Breast: Breasts examined seated and supine. No skin changes, peau d'orange or nipple retraction. No discharge. No axillary or supraclavicular adenopathy. No masses or nodularity palpable.   Abdomen: Soft, non tender, and without distention. Active bowel sounds. No rebound or guarding.  Extremities: No cyanosis, clubbing, erythema, nor edema. Distal pulses intact and symmetric.   Neurological: Alert and oriented x 3.   Psychiatric:  Behavior, euthymic affect    Assessment and Plan:     1. Encounter for screening and preventative care  HCM:  Pap UTD, plan for mammogram at age 42, colonoscopy at age 47  Labs: see below  Immunizations: second COVID19 vaccination due today  Patient counseled about skin care, diet, exercise, supplements, and gun safety.    2. Recurrent major depressive disorder, in full remission (HCC)  - Stable, continue wellbutrin XL 300mg daily; continue exercise, healthy diet, good sleep hygiene     3. BMI 29.0-29.9,adult  - Lipid Profile; Future  - Comp Metabolic Panel; Future  - HEMOGLOBIN A1C; Future    4. Fatigue, unspecified type  5. Weight gain  Héctor reports 20 pound weight gain over past year despite daily exercise, tracking calories, working with nutritionist; she is quite frustrated. Will rule out thyroid dysfunction. Discussed health coaching class  with Diane Stanford DPT, patient may consider  - CBC WITH DIFFERENTIAL; Future  - TSH; Future  - TRIIDOTHYRONINE; Future  - FREE THYROXINE; Future  - Lipid Profile; Future  - Comp Metabolic Panel; Future    6. Need for hepatitis C screening test  - HCV Scrn ( [id number] 1xLife); Future    Other orders  - Multiple Vitamins-Minerals (ONE-A-DAY WOMENS PO); Take  by mouth.  - hydrOXYzine HCl (ATARAX) 10 MG Tab; Take 1 Tablet by mouth 3 times a day as needed for Anxiety.  Dispense: 30 Tablet; Refill: 0  - buPROPion (WELLBUTRIN XL) 300 MG XL tablet; Take 1 Tablet by mouth every morning.  Dispense: 90 Tablet; Refill: 3          Follow-up: Return in about 1 month (around 2021) for pending lab results.

## 2021-11-29 ENCOUNTER — HOSPITAL ENCOUNTER (OUTPATIENT)
Dept: LAB | Facility: MEDICAL CENTER | Age: 36
End: 2021-11-29
Attending: FAMILY MEDICINE
Payer: COMMERCIAL

## 2021-11-29 DIAGNOSIS — R63.5 WEIGHT GAIN: ICD-10-CM

## 2021-11-29 DIAGNOSIS — Z11.59 NEED FOR HEPATITIS C SCREENING TEST: ICD-10-CM

## 2021-11-29 DIAGNOSIS — R53.83 FATIGUE, UNSPECIFIED TYPE: ICD-10-CM

## 2021-11-29 LAB
ALBUMIN SERPL BCP-MCNC: 4.8 G/DL (ref 3.2–4.9)
ALBUMIN/GLOB SERPL: 2.5 G/DL
ALP SERPL-CCNC: 74 U/L (ref 30–99)
ALT SERPL-CCNC: 15 U/L (ref 2–50)
ANION GAP SERPL CALC-SCNC: 9 MMOL/L (ref 7–16)
AST SERPL-CCNC: 13 U/L (ref 12–45)
BASOPHILS # BLD AUTO: 1.3 % (ref 0–1.8)
BASOPHILS # BLD: 0.06 K/UL (ref 0–0.12)
BILIRUB SERPL-MCNC: 1.4 MG/DL (ref 0.1–1.5)
BUN SERPL-MCNC: 11 MG/DL (ref 8–22)
CALCIUM SERPL-MCNC: 9.7 MG/DL (ref 8.5–10.5)
CHLORIDE SERPL-SCNC: 103 MMOL/L (ref 96–112)
CHOLEST SERPL-MCNC: 250 MG/DL (ref 100–199)
CO2 SERPL-SCNC: 26 MMOL/L (ref 20–33)
CREAT SERPL-MCNC: 0.72 MG/DL (ref 0.5–1.4)
EOSINOPHIL # BLD AUTO: 0.1 K/UL (ref 0–0.51)
EOSINOPHIL NFR BLD: 2.1 % (ref 0–6.9)
ERYTHROCYTE [DISTWIDTH] IN BLOOD BY AUTOMATED COUNT: 41 FL (ref 35.9–50)
EST. AVERAGE GLUCOSE BLD GHB EST-MCNC: 94 MG/DL
FASTING STATUS PATIENT QL REPORTED: NORMAL
GLOBULIN SER CALC-MCNC: 1.9 G/DL (ref 1.9–3.5)
GLUCOSE SERPL-MCNC: 88 MG/DL (ref 65–99)
HBA1C MFR BLD: 4.9 % (ref 4–5.6)
HCT VFR BLD AUTO: 47.1 % (ref 37–47)
HCV AB SER QL: NORMAL
HDLC SERPL-MCNC: 61 MG/DL
HGB BLD-MCNC: 15.8 G/DL (ref 12–16)
IMM GRANULOCYTES # BLD AUTO: 0.01 K/UL (ref 0–0.11)
IMM GRANULOCYTES NFR BLD AUTO: 0.2 % (ref 0–0.9)
LDLC SERPL CALC-MCNC: 159 MG/DL
LYMPHOCYTES # BLD AUTO: 1.6 K/UL (ref 1–4.8)
LYMPHOCYTES NFR BLD: 33.4 % (ref 22–41)
MCH RBC QN AUTO: 30.3 PG (ref 27–33)
MCHC RBC AUTO-ENTMCNC: 33.5 G/DL (ref 33.6–35)
MCV RBC AUTO: 90.2 FL (ref 81.4–97.8)
MONOCYTES # BLD AUTO: 0.28 K/UL (ref 0–0.85)
MONOCYTES NFR BLD AUTO: 5.8 % (ref 0–13.4)
NEUTROPHILS # BLD AUTO: 2.74 K/UL (ref 2–7.15)
NEUTROPHILS NFR BLD: 57.2 % (ref 44–72)
NRBC # BLD AUTO: 0 K/UL
NRBC BLD-RTO: 0 /100 WBC
PLATELET # BLD AUTO: 290 K/UL (ref 164–446)
PMV BLD AUTO: 11.2 FL (ref 9–12.9)
POTASSIUM SERPL-SCNC: 4.1 MMOL/L (ref 3.6–5.5)
PROT SERPL-MCNC: 6.7 G/DL (ref 6–8.2)
RBC # BLD AUTO: 5.22 M/UL (ref 4.2–5.4)
SODIUM SERPL-SCNC: 138 MMOL/L (ref 135–145)
T3 SERPL-MCNC: 98 NG/DL (ref 60–181)
T4 FREE SERPL-MCNC: 1.05 NG/DL (ref 0.93–1.7)
TRIGL SERPL-MCNC: 150 MG/DL (ref 0–149)
TSH SERPL DL<=0.005 MIU/L-ACNC: 1.67 UIU/ML (ref 0.38–5.33)
WBC # BLD AUTO: 4.8 K/UL (ref 4.8–10.8)

## 2021-11-29 PROCEDURE — G0472 HEP C SCREEN HIGH RISK/OTHER: HCPCS

## 2021-11-29 PROCEDURE — 84480 ASSAY TRIIODOTHYRONINE (T3): CPT

## 2021-11-29 PROCEDURE — 80053 COMPREHEN METABOLIC PANEL: CPT

## 2021-11-29 PROCEDURE — 83036 HEMOGLOBIN GLYCOSYLATED A1C: CPT

## 2021-11-29 PROCEDURE — 84443 ASSAY THYROID STIM HORMONE: CPT

## 2021-11-29 PROCEDURE — 80061 LIPID PANEL: CPT

## 2021-11-29 PROCEDURE — 36415 COLL VENOUS BLD VENIPUNCTURE: CPT

## 2021-11-29 PROCEDURE — 84439 ASSAY OF FREE THYROXINE: CPT

## 2021-11-29 PROCEDURE — 85025 COMPLETE CBC W/AUTO DIFF WBC: CPT

## 2021-12-01 ENCOUNTER — TELEMEDICINE (OUTPATIENT)
Dept: MEDICAL GROUP | Facility: MEDICAL CENTER | Age: 36
End: 2021-12-01
Payer: COMMERCIAL

## 2021-12-01 VITALS — WEIGHT: 174 LBS | TEMPERATURE: 98.1 F | HEIGHT: 65 IN | BODY MASS INDEX: 28.99 KG/M2 | HEART RATE: 81 BPM

## 2021-12-01 DIAGNOSIS — E78.2 MIXED HYPERLIPIDEMIA: ICD-10-CM

## 2021-12-01 PROCEDURE — 99213 OFFICE O/P EST LOW 20 MIN: CPT | Mod: 95 | Performed by: FAMILY MEDICINE

## 2021-12-01 ASSESSMENT — FIBROSIS 4 INDEX: FIB4 SCORE: 0.39

## 2021-12-01 NOTE — ASSESSMENT & PLAN NOTE
Recent labs demonstrate notable increase in LDL per below. Héctor tries to follow a fairly healthy diet however notes she has not been as diligent lately. She is very motivated to make heart-healthy improvements and repeat her lipid panel in 3-6 months.    Component      Latest Ref Rng & Units 5/12/2017 11/29/2021           9:09 AM  9:30 AM   Cholesterol,Tot      100 - 199 mg/dL 173 250 (H)   Triglycerides      0 - 149 mg/dL 108 150 (H)   HDL      >=40 mg/dL 52 61   LDL      <100 mg/dL 99 159 (H)     
(4) rarely moist

## 2021-12-01 NOTE — PROGRESS NOTES
This evaluation was conducted via Zoom using secure and encrypted videoconferencing technology. The patient was in a private location in the state of [place].    The patient's identity was confirmed and verbal consent was obtained for this virtual visit.    Subjective:     CC: follow up labs    HPI:   Nicolasa presents today with     Mixed hyperlipidemia  Recent labs demonstrate notable increase in LDL per below. Héctor tries to follow a fairly healthy diet however notes she has not been as diligent lately. She is very motivated to make heart-healthy improvements and repeat her lipid panel in 3-6 months.    Component      Latest Ref Rng & Units 5/12/2017 11/29/2021           9:09 AM  9:30 AM   Cholesterol,Tot      100 - 199 mg/dL 173 250 (H)   Triglycerides      0 - 149 mg/dL 108 150 (H)   HDL      >=40 mg/dL 52 61   LDL      <100 mg/dL 99 159 (H)       Past Medical History:   Diagnosis Date   • Bronchitis 2009   • Depression 1/7/2015   • Dysplasia of cervix, high grade STEPHANIE 2 11/6/2018   • Episode of recurrent major depressive disorder (HCC) 10/12/2017   • Other specified disorder of intestines    • Pneumonia     age 18   • Post-partum depression    • Sinus tachycardia     Only while pregnant       Social History     Tobacco Use   • Smoking status: Never Smoker   • Smokeless tobacco: Never Used   Vaping Use   • Vaping Use: Never used   Substance Use Topics   • Alcohol use: No   • Drug use: No       Current Outpatient Medications Ordered in Epic   Medication Sig Dispense Refill   • Multiple Vitamins-Minerals (ONE-A-DAY WOMENS PO) Take  by mouth.     • buPROPion (WELLBUTRIN XL) 300 MG XL tablet Take 1 Tablet by mouth every morning. 90 Tablet 3   • sertraline (ZOLOFT) 50 MG Tab TAKE 1 & 1/2 TABLET BY MOUTH ONCE DAILY 135 Tablet 3     No current Epic-ordered facility-administered medications on file.       Allergies:  [place] meal, [place] nuts, Cashew nut oil, Macadamia nut oil, Other food, Pecan, Ultram [tramadol hcl],  "and Zofran [ondansetron]    Health Maintenance: pt received COVID19 vaccination    ROS:  Gen: no fevers/chills, no changes in weight  Eyes: no changes in vision  ENT: no sore throat, no hearing loss, no bloody nose  Pulm: no SOB  CV: no chest pain        Objective:       Exam:  Pulse 81 Comment: pt. reported  Temp 36.7 °C (98.1 °F) Comment: pt. reported  Ht 1.651 m (5' 5\") Comment: pt. reported  Wt 78.9 kg (174 lb) Comment: pt. reported  LMP 10/11/2017 (LMP Unknown)   BMI 28.96 kg/m²  Body mass index is 28.96 kg/m².    Constitutional: Alert, no distress, well-groomed  Skin: Warm, dry, good turgor, no rashes in visible areas  Eyes: Equal, round and reactive, conjunctiva clear, no ptosis  Respiratory: Unlabored respiratory effort  Psych: Alert and oriented, normal affect and mood        Assessment & Plan:     36 y.o. female with the following -     1. Mixed hyperlipidemia  Discussed whole foods Mediterranean type diet; [place] is motivated to make healthy dietary changes; plan to repeat labs in 3-6 months  - Lipid Profile; Future      Return in about 4 months (around 4/1/2022).    Please note this dictation was created using voice recognition software. I have made every reasonable attempt to correct obvious errors, but I expect there may be errors of grammar, and possibly content, that I did not discover before finalizing the note.       "

## 2022-03-04 ENCOUNTER — HOSPITAL ENCOUNTER (OUTPATIENT)
Dept: RADIOLOGY | Facility: MEDICAL CENTER | Age: 37
End: 2022-03-04
Attending: PHYSICIAN ASSISTANT
Payer: COMMERCIAL

## 2022-04-22 ENCOUNTER — HOSPITAL ENCOUNTER (OUTPATIENT)
Dept: RADIOLOGY | Facility: MEDICAL CENTER | Age: 37
End: 2022-04-22
Attending: PHYSICIAN ASSISTANT
Payer: COMMERCIAL

## 2022-04-22 DIAGNOSIS — M24.152 ARTICULAR CARTILAGE DISORDER OF LEFT HIP: ICD-10-CM

## 2022-04-22 PROCEDURE — A9576 INJ PROHANCE MULTIPACK: HCPCS | Performed by: PHYSICIAN ASSISTANT

## 2022-04-22 PROCEDURE — 700101 HCHG RX REV CODE 250: Performed by: PHYSICIAN ASSISTANT

## 2022-04-22 PROCEDURE — 27093 INJECTION FOR HIP X-RAY: CPT | Mod: LT

## 2022-04-22 PROCEDURE — 73722 MRI JOINT OF LWR EXTR W/DYE: CPT | Mod: LT

## 2022-04-22 PROCEDURE — 700117 HCHG RX CONTRAST REV CODE 255: Performed by: PHYSICIAN ASSISTANT

## 2022-04-22 PROCEDURE — 700111 HCHG RX REV CODE 636 W/ 250 OVERRIDE (IP): Performed by: PHYSICIAN ASSISTANT

## 2022-04-22 RX ORDER — TRIAMCINOLONE ACETONIDE 40 MG/ML
40 INJECTION, SUSPENSION INTRA-ARTICULAR; INTRAMUSCULAR ONCE
Status: COMPLETED | OUTPATIENT
Start: 2022-04-22 | End: 2022-04-22

## 2022-04-22 RX ORDER — TRIAMCINOLONE ACETONIDE 40 MG/ML
40 INJECTION, SUSPENSION INTRA-ARTICULAR; INTRAMUSCULAR ONCE
Status: DISCONTINUED | OUTPATIENT
Start: 2022-04-22 | End: 2022-04-22

## 2022-04-22 RX ADMIN — TRIAMCINOLONE ACETONIDE 1 MG: 40 INJECTION, SUSPENSION INTRA-ARTICULAR; INTRAMUSCULAR at 14:15

## 2022-04-22 RX ADMIN — LIDOCAINE HYDROCHLORIDE 10 ML: 10 INJECTION, SOLUTION INFILTRATION; PERINEURAL at 14:15

## 2022-04-22 RX ADMIN — GADOTERIDOL 0.1 ML: 279.3 INJECTION, SOLUTION INTRAVENOUS at 14:47

## 2022-04-22 RX ADMIN — IOHEXOL 5 ML: 300 INJECTION, SOLUTION INTRAVENOUS at 14:47

## 2022-09-14 ENCOUNTER — OFFICE VISIT (OUTPATIENT)
Dept: MEDICAL GROUP | Facility: MEDICAL CENTER | Age: 37
End: 2022-09-14
Payer: COMMERCIAL

## 2022-09-14 VITALS
HEIGHT: 65 IN | OXYGEN SATURATION: 96 % | TEMPERATURE: 98 F | BODY MASS INDEX: 27.49 KG/M2 | HEART RATE: 80 BPM | DIASTOLIC BLOOD PRESSURE: 72 MMHG | RESPIRATION RATE: 14 BRPM | WEIGHT: 165 LBS | SYSTOLIC BLOOD PRESSURE: 112 MMHG

## 2022-09-14 DIAGNOSIS — E78.2 MIXED HYPERLIPIDEMIA: ICD-10-CM

## 2022-09-14 DIAGNOSIS — F90.2 ATTENTION DEFICIT HYPERACTIVITY DISORDER (ADHD), COMBINED TYPE: ICD-10-CM

## 2022-09-14 PROCEDURE — 99214 OFFICE O/P EST MOD 30 MIN: CPT | Performed by: FAMILY MEDICINE

## 2022-09-14 RX ORDER — DULOXETIN HYDROCHLORIDE 60 MG/1
CAPSULE, DELAYED RELEASE ORAL
COMMUNITY
Start: 2022-08-10

## 2022-09-14 ASSESSMENT — PATIENT HEALTH QUESTIONNAIRE - PHQ9: CLINICAL INTERPRETATION OF PHQ2 SCORE: 0

## 2022-09-14 ASSESSMENT — FIBROSIS 4 INDEX: FIB4 SCORE: 0.41

## 2022-09-14 NOTE — ASSESSMENT & PLAN NOTE
Héctor is working on trying to maintain a healthy diet. She had repeat labs completed through [facility] 4/22, requesting records.     Component      Latest Ref Rng & Units 5/12/2017 11/29/2021           9:09 AM  9:30 AM   Cholesterol,Tot      100 - 199 mg/dL 173 250 (H)   Triglycerides      0 - 149 mg/dL 108 150 (H)   HDL      >=40 mg/dL 52 61   LDL      <100 mg/dL 99 159 (H)

## 2022-09-14 NOTE — PROGRESS NOTES
Subjective:     CC: f/u, discuss ADHD    HPI:   Nicolasa presents today with:    Attention deficit hyperactivity disorder (ADHD), combined type  Héctor was recently diagnosed with ADHD. She is seeing a psychiatrist and therapist through the [facility]. She is currently on cymbalta and may be interested in starting a stimulant medication.    Mixed hyperlipidemia  Héctor is working on trying to maintain a healthy diet. She had repeat labs completed through [facility] 4/22, requesting records.     Component      Latest Ref Rng & Units 5/12/2017 11/29/2021           9:09 AM  9:30 AM   Cholesterol,Tot      100 - 199 mg/dL 173 250 (H)   Triglycerides      0 - 149 mg/dL 108 150 (H)   HDL      >=40 mg/dL 52 61   LDL      <100 mg/dL 99 159 (H)       Past Medical History:   Diagnosis Date    Bronchitis 2009    Depression 1/7/2015    Dysplasia of cervix, high grade STEPHANIE 2 11/6/2018    Episode of recurrent major depressive disorder (HCC) 10/12/2017    Other specified disorder of intestines     Pneumonia     age 18    Post-partum depression     Sinus tachycardia     Only while pregnant       Social History     Tobacco Use    Smoking status: Never    Smokeless tobacco: Never   Vaping Use    Vaping Use: Never used   Substance Use Topics    Alcohol use: No    Drug use: No       Current Outpatient Medications Ordered in Epic   Medication Sig Dispense Refill    DULoxetine (CYMBALTA) 60 MG Cap DR Particles delayed-release capsule       Multiple Vitamins-Minerals (ONE-A-DAY WOMENS PO) Take  by mouth.       No current Epic-ordered facility-administered medications on file.       Allergies:  [place] meal, Brazil nuts, Cashew nut oil, Macadamia nut oil, Other food, Pecan, Ultram [tramadol hcl], and Zofran [ondansetron]    Health Maintenance: Discussed vaccinations    ROS:  Gen: no fevers/chills, no changes in weight  Eyes: no changes in vision  ENT: no sore throat, no hearing loss, no bloody nose  Pulm: no SOB  CV: no chest  "pain        Objective:       Exam:  /72 (BP Location: Right arm, Patient Position: Sitting, BP Cuff Size: Adult)   Pulse 80   Temp 36.7 °C (98 °F) (Temporal)   Resp 14   Ht 1.651 m (5' 5\")   Wt 74.8 kg (165 lb)   LMP 10/11/2017 (LMP Unknown)   SpO2 96%   BMI 27.46 kg/m²  Body mass index is 27.46 kg/m².    Gen: Alert and oriented, No apparent distress  Lungs: Normal effort, CTA bilaterally, no wheezes, rhonchi, or rales  CV: Regular rate and rhythm, no murmurs, rubs, or gallops      Assessment & Plan:     37 y.o. female with the following -     1. Attention deficit hyperactivity disorder (ADHD), combined type  Patient currently getting care through [facility], she would like to see a psychiatrist in person  - Referral to Psychiatry  - DULoxetine (CYMBALTA) 60 MG Cap DR Particles delayed-release capsule    2. Mixed hyperlipidemia  Patient trying to maintain healthy diet, requesting records for most recent lipid panel      Return in about 3 months (around 12/14/2022).Patient notified I will be leaving [facility] November 8, 2022. I encouraged the patient to establish with new [facility] PCP if she desires. Patient instructed to call [phone] to schedule. It has been my privilege caring for Héctor.      Please note this dictation was created using voice recognition software. I have made every reasonable attempt to correct obvious errors, but I expect there may be errors of grammar, and possibly content, that I did not discover before finalizing the note.         "

## 2022-09-14 NOTE — LETTER
[facility]  Rayne Joyce M.D.  [address]  [facility] [address]  Fax: [phone]   Authorization for Release/Disclosure of   Protected Health Information   Name: CHRISTINE LARA : 1985 SSN: [id number]   Address: [address] [place] [id number] Phone:    [phone] (home)    I authorize the entity listed below to release/disclose the PHI below to:   [facility]/Rayne Joyce M.D. and Rayne Joyce M.D.   Provider or Entity Name:     Address   City, State, Zip   Phone:      Fax:     Reason for request: continuity of care   Information to be released:    [  ] LAST COLONOSCOPY,  including any PATH REPORT and follow-up  [  ] LAST FIT/COLOGUARD RESULT [  ] LAST DEXA  [  ] LAST MAMMOGRAM  [  ] LAST PAP  [  ] LAST LABS [  ] RETINA EXAM REPORT  [  ] IMMUNIZATION RECORDS  [  ] Release all info      [  ] Check here and initial the line next to each item to release ALL health information INCLUDING  _____ Care and treatment for drug and / or alcohol abuse  _____ HIV testing, infection status, or AIDS  _____ Genetic Testing    DATES OF SERVICE OR TIME PERIOD TO BE DISCLOSED: _____________  I understand and acknowledge that:  * This Authorization may be revoked at any time by you in writing, except if your health information has already been used or disclosed.  * Your health information that will be used or disclosed as a result of you signing this authorization could be re-disclosed by the recipient. If this occurs, your re-disclosed health information may no longer be protected by State or Federal laws.  * You may refuse to sign this Authorization. Your refusal will not affect your ability to obtain treatment.  * This Authorization becomes effective upon signing and will  on (date) __________.      If no date is indicated, this Authorization will  one (1) year from the signature date.    Name: Christine Lara    Signature:   Date:     2022        PLEASE FAX REQUESTED RECORDS BACK TO: [phone]

## 2022-09-14 NOTE — ASSESSMENT & PLAN NOTE
Héctor was recently diagnosed with ADHD. She is seeing a psychiatrist and therapist through [facility]. She is currently on cymbalta and may be interested in starting a stimulant medication.

## 2024-04-25 ENCOUNTER — OFFICE VISIT (OUTPATIENT)
Dept: URGENT CARE | Facility: CLINIC | Age: 39
End: 2024-04-25
Payer: COMMERCIAL

## 2024-04-25 ENCOUNTER — HOSPITAL ENCOUNTER (OUTPATIENT)
Facility: MEDICAL CENTER | Age: 39
End: 2024-04-25
Attending: PHYSICIAN ASSISTANT
Payer: COMMERCIAL

## 2024-04-25 VITALS
HEIGHT: 64 IN | SYSTOLIC BLOOD PRESSURE: 110 MMHG | BODY MASS INDEX: 22.2 KG/M2 | OXYGEN SATURATION: 100 % | RESPIRATION RATE: 16 BRPM | TEMPERATURE: 97.2 F | HEART RATE: 109 BPM | WEIGHT: 130 LBS | DIASTOLIC BLOOD PRESSURE: 80 MMHG

## 2024-04-25 DIAGNOSIS — N30.00 ACUTE CYSTITIS WITHOUT HEMATURIA: ICD-10-CM

## 2024-04-25 DIAGNOSIS — N39.0 RECURRENT UTI: ICD-10-CM

## 2024-04-25 LAB
POCT INT CON NEG: NEGATIVE
POCT INT CON POS: POSITIVE
POCT URINE PREGNANCY TEST: NEGATIVE

## 2024-04-25 PROCEDURE — 3074F SYST BP LT 130 MM HG: CPT | Performed by: PHYSICIAN ASSISTANT

## 2024-04-25 PROCEDURE — 87086 URINE CULTURE/COLONY COUNT: CPT

## 2024-04-25 PROCEDURE — 3079F DIAST BP 80-89 MM HG: CPT | Performed by: PHYSICIAN ASSISTANT

## 2024-04-25 PROCEDURE — 99214 OFFICE O/P EST MOD 30 MIN: CPT | Performed by: PHYSICIAN ASSISTANT

## 2024-04-25 PROCEDURE — 81025 URINE PREGNANCY TEST: CPT | Performed by: PHYSICIAN ASSISTANT

## 2024-04-25 RX ORDER — SULFAMETHOXAZOLE AND TRIMETHOPRIM 800; 160 MG/1; MG/1
1 TABLET ORAL 2 TIMES DAILY
COMMUNITY
Start: 2024-04-16

## 2024-04-25 RX ORDER — DEXTROAMPHETAMINE SACCHARATE, AMPHETAMINE ASPARTATE MONOHYDRATE, DEXTROAMPHETAMINE SULFATE AND AMPHETAMINE SULFATE 5; 5; 5; 5 MG/1; MG/1; MG/1; MG/1
20 CAPSULE, EXTENDED RELEASE ORAL EVERY MORNING
COMMUNITY

## 2024-04-25 RX ORDER — ALBUTEROL SULFATE 90 UG/1
AEROSOL, METERED RESPIRATORY (INHALATION)
COMMUNITY
End: 2024-04-28

## 2024-04-25 RX ORDER — FLUCONAZOLE 150 MG/1
150 TABLET ORAL ONCE
COMMUNITY
Start: 2024-04-16

## 2024-04-25 RX ORDER — PHENAZOPYRIDINE HYDROCHLORIDE 200 MG/1
200 TABLET, FILM COATED ORAL 3 TIMES DAILY PRN
COMMUNITY
Start: 2024-04-16

## 2024-04-25 RX ORDER — CEFDINIR 300 MG/1
300 CAPSULE ORAL 2 TIMES DAILY
Qty: 14 CAPSULE | Refills: 0 | Status: SHIPPED | OUTPATIENT
Start: 2024-04-25 | End: 2024-05-02

## 2024-04-25 RX ORDER — PROCHLORPERAZINE MALEATE 10 MG
1 TABLET ORAL EVERY 8 HOURS PRN
COMMUNITY
End: 2024-04-28

## 2024-04-25 ASSESSMENT — ENCOUNTER SYMPTOMS
RESPIRATORY NEGATIVE: 1
DIARRHEA: 0
FLANK PAIN: 0
NAUSEA: 0
VOMITING: 0
ABDOMINAL PAIN: 0
CARDIOVASCULAR NEGATIVE: 1
CONSTITUTIONAL NEGATIVE: 1

## 2024-04-25 NOTE — PROGRESS NOTES
"  Subjective:     Nicolasa Magaña  is a 39 y.o. female who presents for UTI (Frequency and burning x 2 weeks)       She presents today with ongoing urinary frequency and painful urination has been ongoing over the last 2 weeks.  Symptoms initially began and she was seen via telehealth 2 weeks ago, was started on Bactrim but states she only received a few days of symptom relief with this medication before they returned.  She did have a follow-up urinalysis performed at [facility] yesterday, she does have the results available on her phone, which did not show signs of a ongoing infection.  She denies any fevers, no flank pain.  No vaginal pain bleeding or discharge.       Review of Systems   Constitutional: Negative.    Respiratory: Negative.     Cardiovascular: Negative.    Gastrointestinal:  Negative for abdominal pain, diarrhea, nausea and vomiting.   Genitourinary:  Positive for dysuria and frequency. Negative for flank pain, hematuria and urgency.      Allergies   Allergen Reactions    [place] Meal      Canker sores    Brazil Nuts      Canker sores    Cashew Nut Oil      Canker sores    Macadamia Nut Oil      Canker sores    Other Food      Walnuts cause throat swelling but not anaphylactic. Chestnuts cause canker sores    Pecan      Canker sores    Ultram [Tramadol Hcl]      Nausea and dizziness.    Zofran [Ondansetron] Unspecified     migraine     Past Medical History:   Diagnosis Date    Bronchitis 2009    Depression 1/7/2015    Dysplasia of cervix, high grade STEPHANIE 2 11/6/2018    Episode of recurrent major depressive disorder (HCC) 10/12/2017    Other specified disorder of intestines     Pneumonia     age 18    Post-partum depression     Sinus tachycardia     Only while pregnant        Objective:   /80 (BP Location: Left arm, Patient Position: Sitting, BP Cuff Size: Adult)   Pulse (!) 109   Temp 36.2 °C (97.2 °F) (Temporal)   Resp 16   Ht 1.626 m (5' 4\")   Wt 59 kg (130 lb)   LMP 10/11/2017 (LMP " Unknown)   SpO2 100%   BMI 22.31 kg/m²   Physical Exam  Vitals and nursing note reviewed.   Constitutional:       General: She is not in acute distress.     Appearance: She is not ill-appearing or toxic-appearing.   HENT:      Head: Normocephalic.   Eyes:      General: No scleral icterus.     Conjunctiva/sclera: Conjunctivae normal.   Cardiovascular:      Rate and Rhythm: Normal rate and regular rhythm.   Pulmonary:      Effort: Pulmonary effort is normal. No respiratory distress.      Breath sounds: Normal breath sounds. No stridor.   Abdominal:      General: Abdomen is flat. Bowel sounds are normal. There is no distension.      Palpations: Abdomen is soft.      Tenderness: There is no abdominal tenderness. There is no right CVA tenderness, left CVA tenderness or guarding.   Musculoskeletal:      Cervical back: Neck supple.   Neurological:      Mental Status: She is alert and oriented to person, place, and time.   Psychiatric:         Mood and Affect: Mood normal.         Behavior: Behavior normal.         Thought Content: Thought content normal.         Judgment: Judgment normal.             Diagnostic testing:    Urine hCG-negative    Urine culture-pending    Assessment/Plan:     Encounter Diagnoses   Name Primary?    Acute cystitis without hematuria     Recurrent UTI           Plan for care for today's complaint includes starting the patient on cefdinir for acute cystitis based on urinalysis results; patient was able to present the [facility] UA results that were obtained yesterday so we did withhold from ordering a repeat UA on today's examination.  Discussed with the patient that due to the recurrent nature of her UTI as well as suspected resistance to Bactrim we will obtain a urine culture; will contact the patient via [employer] message to discuss the results of the testing obtained today, will adjust treatment plan accordingly.  No evidence of pyelonephritis on exam today.  Patient was tachycardic initially on  examination but recheck did show normal heart rate.  Continue to monitor symptoms and return to urgent care or follow-up with primary care provider if symptoms remain ongoing.  Follow-up in the emergency department if symptoms become severe, ER precautions discussed in office today..  Prescription for cefdinir provided.    See AVS Instructions below for written guidance provided to patient on after-visit management and care in addition to our verbal discussion during the visit.    Please note that this dictation was created using voice recognition software. I have attempted to correct all errors, but there may be sound-alike, spelling, grammar and possibly content errors that I did not discover before finalizing the note.    Soldotnacha Vargas PA-C

## 2024-04-28 ENCOUNTER — APPOINTMENT (OUTPATIENT)
Dept: RADIOLOGY | Facility: MEDICAL CENTER | Age: 39
End: 2024-04-28
Attending: EMERGENCY MEDICINE
Payer: COMMERCIAL

## 2024-04-28 ENCOUNTER — HOSPITAL ENCOUNTER (EMERGENCY)
Facility: MEDICAL CENTER | Age: 39
End: 2024-04-28
Attending: EMERGENCY MEDICINE
Payer: COMMERCIAL

## 2024-04-28 VITALS
RESPIRATION RATE: 20 BRPM | OXYGEN SATURATION: 96 % | TEMPERATURE: 98.2 F | HEIGHT: 64 IN | BODY MASS INDEX: 22.47 KG/M2 | WEIGHT: 131.61 LBS | HEART RATE: 98 BPM | SYSTOLIC BLOOD PRESSURE: 115 MMHG | DIASTOLIC BLOOD PRESSURE: 57 MMHG

## 2024-04-28 DIAGNOSIS — R25.1 SHAKINESS: ICD-10-CM

## 2024-04-28 DIAGNOSIS — R00.0 SINUS TACHYCARDIA: ICD-10-CM

## 2024-04-28 DIAGNOSIS — R10.32 LEFT LOWER QUADRANT ABDOMINAL PAIN: ICD-10-CM

## 2024-04-28 LAB
ALBUMIN SERPL BCP-MCNC: 5 G/DL (ref 3.2–4.9)
ALBUMIN/GLOB SERPL: 1.7 G/DL
ALP SERPL-CCNC: 73 U/L (ref 30–99)
ALT SERPL-CCNC: 19 U/L (ref 2–50)
ANION GAP SERPL CALC-SCNC: 15 MMOL/L (ref 7–16)
APPEARANCE UR: CLEAR
AST SERPL-CCNC: 21 U/L (ref 12–45)
BACTERIA UR CULT: NORMAL
BASOPHILS # BLD AUTO: 1 % (ref 0–1.8)
BASOPHILS # BLD: 0.09 K/UL (ref 0–0.12)
BILIRUB SERPL-MCNC: 1.1 MG/DL (ref 0.1–1.5)
BILIRUB UR QL STRIP.AUTO: NEGATIVE
BUN SERPL-MCNC: 10 MG/DL (ref 8–22)
CALCIUM ALBUM COR SERPL-MCNC: 9 MG/DL (ref 8.5–10.5)
CALCIUM SERPL-MCNC: 9.8 MG/DL (ref 8.4–10.2)
CHLORIDE SERPL-SCNC: 102 MMOL/L (ref 96–112)
CO2 SERPL-SCNC: 20 MMOL/L (ref 20–33)
COLOR UR: YELLOW
CREAT SERPL-MCNC: 0.71 MG/DL (ref 0.5–1.4)
EKG IMPRESSION: NORMAL
EKG IMPRESSION: NORMAL
EOSINOPHIL # BLD AUTO: 0.2 K/UL (ref 0–0.51)
EOSINOPHIL NFR BLD: 2.2 % (ref 0–6.9)
ERYTHROCYTE [DISTWIDTH] IN BLOOD BY AUTOMATED COUNT: 41 FL (ref 35.9–50)
FLUAV RNA SPEC QL NAA+PROBE: NEGATIVE
FLUBV RNA SPEC QL NAA+PROBE: NEGATIVE
GFR SERPLBLD CREATININE-BSD FMLA CKD-EPI: 112 ML/MIN/1.73 M 2
GLOBULIN SER CALC-MCNC: 2.9 G/DL (ref 1.9–3.5)
GLUCOSE SERPL-MCNC: 91 MG/DL (ref 65–99)
GLUCOSE UR STRIP.AUTO-MCNC: NEGATIVE MG/DL
HCG SERPL QL: NEGATIVE
HCT VFR BLD AUTO: 47.7 % (ref 37–47)
HGB BLD-MCNC: 15.9 G/DL (ref 12–16)
IMM GRANULOCYTES # BLD AUTO: 0.04 K/UL (ref 0–0.11)
IMM GRANULOCYTES NFR BLD AUTO: 0.4 % (ref 0–0.9)
KETONES UR STRIP.AUTO-MCNC: ABNORMAL MG/DL
LACTATE SERPL-SCNC: 1.2 MMOL/L (ref 0.5–2)
LEUKOCYTE ESTERASE UR QL STRIP.AUTO: NEGATIVE
LIPASE SERPL-CCNC: 31 U/L (ref 11–82)
LYMPHOCYTES # BLD AUTO: 2.38 K/UL (ref 1–4.8)
LYMPHOCYTES NFR BLD: 25.8 % (ref 22–41)
MCH RBC QN AUTO: 30.3 PG (ref 27–33)
MCHC RBC AUTO-ENTMCNC: 33.3 G/DL (ref 32.2–35.5)
MCV RBC AUTO: 91 FL (ref 81.4–97.8)
MICRO URNS: ABNORMAL
MONOCYTES # BLD AUTO: 0.43 K/UL (ref 0–0.85)
MONOCYTES NFR BLD AUTO: 4.7 % (ref 0–13.4)
NEUTROPHILS # BLD AUTO: 6.08 K/UL (ref 1.82–7.42)
NEUTROPHILS NFR BLD: 65.9 % (ref 44–72)
NITRITE UR QL STRIP.AUTO: NEGATIVE
NRBC # BLD AUTO: 0 K/UL
NRBC BLD-RTO: 0 /100 WBC (ref 0–0.2)
PH UR STRIP.AUTO: 5.5 [PH] (ref 5–8)
PLATELET # BLD AUTO: 384 K/UL (ref 164–446)
PMV BLD AUTO: 9.8 FL (ref 9–12.9)
POTASSIUM SERPL-SCNC: 4 MMOL/L (ref 3.6–5.5)
PROT SERPL-MCNC: 7.9 G/DL (ref 6–8.2)
PROT UR QL STRIP: NEGATIVE MG/DL
RBC # BLD AUTO: 5.24 M/UL (ref 4.2–5.4)
RBC UR QL AUTO: NEGATIVE
RSV RNA SPEC QL NAA+PROBE: NEGATIVE
SARS-COV-2 RNA RESP QL NAA+PROBE: NOTDETECTED
SIGNIFICANT IND 70042: NORMAL
SITE SITE: NORMAL
SODIUM SERPL-SCNC: 137 MMOL/L (ref 135–145)
SOURCE SOURCE: NORMAL
SP GR UR STRIP.AUTO: 1.01
SPECIMEN SOURCE: NORMAL
WBC # BLD AUTO: 9.2 K/UL (ref 4.8–10.8)

## 2024-04-28 PROCEDURE — 84703 CHORIONIC GONADOTROPIN ASSAY: CPT

## 2024-04-28 PROCEDURE — 700105 HCHG RX REV CODE 258: Performed by: EMERGENCY MEDICINE

## 2024-04-28 PROCEDURE — 700101 HCHG RX REV CODE 250: Performed by: EMERGENCY MEDICINE

## 2024-04-28 PROCEDURE — 0241U HCHG SARS-COV-2 COVID-19 NFCT DS RESP RNA 4 TRGT MIC: CPT

## 2024-04-28 PROCEDURE — 83690 ASSAY OF LIPASE: CPT

## 2024-04-28 PROCEDURE — 74177 CT ABD & PELVIS W/CONTRAST: CPT

## 2024-04-28 PROCEDURE — 80053 COMPREHEN METABOLIC PANEL: CPT

## 2024-04-28 PROCEDURE — 83605 ASSAY OF LACTIC ACID: CPT

## 2024-04-28 PROCEDURE — 700117 HCHG RX CONTRAST REV CODE 255: Performed by: EMERGENCY MEDICINE

## 2024-04-28 PROCEDURE — 96374 THER/PROPH/DIAG INJ IV PUSH: CPT

## 2024-04-28 PROCEDURE — 93005 ELECTROCARDIOGRAM TRACING: CPT | Performed by: EMERGENCY MEDICINE

## 2024-04-28 PROCEDURE — 99285 EMERGENCY DEPT VISIT HI MDM: CPT

## 2024-04-28 PROCEDURE — 93005 ELECTROCARDIOGRAM TRACING: CPT

## 2024-04-28 PROCEDURE — 71045 X-RAY EXAM CHEST 1 VIEW: CPT

## 2024-04-28 PROCEDURE — 81003 URINALYSIS AUTO W/O SCOPE: CPT

## 2024-04-28 PROCEDURE — 96375 TX/PRO/DX INJ NEW DRUG ADDON: CPT

## 2024-04-28 PROCEDURE — 36415 COLL VENOUS BLD VENIPUNCTURE: CPT

## 2024-04-28 PROCEDURE — 85025 COMPLETE CBC W/AUTO DIFF WBC: CPT

## 2024-04-28 PROCEDURE — 700111 HCHG RX REV CODE 636 W/ 250 OVERRIDE (IP): Mod: JZ | Performed by: EMERGENCY MEDICINE

## 2024-04-28 RX ORDER — ACETAMINOPHEN 500 MG
1000 TABLET ORAL EVERY 6 HOURS PRN
COMMUNITY

## 2024-04-28 RX ORDER — METOPROLOL TARTRATE 1 MG/ML
5 INJECTION, SOLUTION INTRAVENOUS ONCE
Status: COMPLETED | OUTPATIENT
Start: 2024-04-28 | End: 2024-04-28

## 2024-04-28 RX ORDER — KETOROLAC TROMETHAMINE 15 MG/ML
15 INJECTION, SOLUTION INTRAMUSCULAR; INTRAVENOUS ONCE
Status: DISCONTINUED | OUTPATIENT
Start: 2024-04-28 | End: 2024-04-28

## 2024-04-28 RX ORDER — KETOROLAC TROMETHAMINE 15 MG/ML
15 INJECTION, SOLUTION INTRAMUSCULAR; INTRAVENOUS ONCE
Status: COMPLETED | OUTPATIENT
Start: 2024-04-28 | End: 2024-04-28

## 2024-04-28 RX ORDER — CIPROFLOXACIN 500 MG/1
500 TABLET, FILM COATED ORAL 2 TIMES DAILY
COMMUNITY

## 2024-04-28 RX ORDER — SODIUM CHLORIDE 9 MG/ML
1000 INJECTION, SOLUTION INTRAVENOUS ONCE
Status: COMPLETED | OUTPATIENT
Start: 2024-04-28 | End: 2024-04-28

## 2024-04-28 RX ORDER — LORAZEPAM 2 MG/ML
1 INJECTION INTRAMUSCULAR ONCE
Status: COMPLETED | OUTPATIENT
Start: 2024-04-28 | End: 2024-04-28

## 2024-04-28 RX ORDER — METRONIDAZOLE 500 MG/1
500 TABLET ORAL EVERY 8 HOURS
COMMUNITY

## 2024-04-28 RX ADMIN — SODIUM CHLORIDE 1000 ML: 9 INJECTION, SOLUTION INTRAVENOUS at 16:47

## 2024-04-28 RX ADMIN — LORAZEPAM 1 MG: 2 INJECTION INTRAMUSCULAR; INTRAVENOUS at 16:58

## 2024-04-28 RX ADMIN — METOPROLOL TARTRATE 5 MG: 5 INJECTION INTRAVENOUS at 19:40

## 2024-04-28 RX ADMIN — KETOROLAC TROMETHAMINE 15 MG: 15 INJECTION, SOLUTION INTRAMUSCULAR; INTRAVENOUS at 17:58

## 2024-04-28 RX ADMIN — SODIUM CHLORIDE 1000 ML: 9 INJECTION, SOLUTION INTRAVENOUS at 18:39

## 2024-04-28 RX ADMIN — IOHEXOL 100 ML: 350 INJECTION, SOLUTION INTRAVENOUS at 17:02

## 2024-04-28 NOTE — ED PROVIDER NOTES
ED Provider Note    CHIEF COMPLAINT  Chief Complaint   Patient presents with    Allergic Reaction     Onset one hour ago feeling lightheaded, confused per spouse ( taking longer to answer questions), tingling hands and feet. Denies SOB or oral/ airway swelling.  On course of cipro and flagy- on day 2 of course.        EXTERNAL RECORDS REVIEWED  4/25/2024, outpatient family medicine, recurrent UTI    HPI/ROS  OUTSIDE HISTORIAN(S):  Significant other    Nicolasa Magaña is a 39 y.o. female who presents for evaluation of acute onset of tingling in her hands and feet associated with some lightheadedness and confusion.  This occurred while she was standing in line for food at the local baseball stadium.  She denies any shortness of breath, no tongue lip or oral swelling otherwise.  No vomiting.  The confusion and lightheadedness have since resolved, the tingling is improved.  The patient reports that she just started a course of Cipro and Flagyl 2 days ago for presumed recurrent diverticulitis.  Prior to that she just finished a course of Bactrim for UTI.  The patient has this recurrent diverticulitis for which she has been seen by Dr. Walker, colorectal surgeon and reports that if there is another bout of diverticulitis she will be considered for partial colectomy.    PAST MEDICAL HISTORY   has a past medical history of Bronchitis (2009), Depression (1/7/2015), Dysplasia of cervix, high grade STEPHANIE 2 (11/6/2018), Episode of recurrent major depressive disorder (HCC) (10/12/2017), Other specified disorder of intestines, Pneumonia, Post-partum depression, and Sinus tachycardia.    SURGICAL HISTORY   has a past surgical history that includes tonsillectomy; jose alejandro by laparoscopy (8/30/2012); other (Bilateral); and abdominal subtotal hysterectomy.    FAMILY HISTORY  Family History   Problem Relation Age of Onset    Alcohol/Drug Mother     Stroke Mother     Heart Disease Sister         half sister mom side heart murmur     "Depression Maternal Grandmother     Depression Maternal Grandfather     Cancer Paternal Grandfather         lung cancer       SOCIAL HISTORY  Social History     Tobacco Use    Smoking status: Never    Smokeless tobacco: Never   Vaping Use    Vaping Use: Never used   Substance and Sexual Activity    Alcohol use: No    Drug use: No    Sexual activity: Yes     Partners: Male     Birth control/protection: Surgical       CURRENT MEDICATIONS  Home Medications    **Home medications have not yet been reviewed for this encounter**         ALLERGIES  Allergies   Allergen Reactions    [place] Meal      Canker sores    [place] Nuts      Canker sores    Cashew Nut Oil      Canker sores    Macadamia Nut Oil      Canker sores    Other Food      Walnuts cause throat swelling but not anaphylactic. Chestnuts cause canker sores    Pecan      Canker sores    Ultram [Tramadol Hcl]      Nausea and dizziness.    Zofran [Ondansetron] Unspecified     migraine       PHYSICAL EXAM  VITAL SIGNS: /75   Pulse (!) 145   Temp 37.8 °C (100 °F) (Temporal)   Resp 20   Ht 1.626 m (5' 4\")   Wt 59.7 kg (131 lb 9.8 oz)   LMP 10/11/2017 (LMP Unknown)   SpO2 99%   BMI 22.59 kg/m²    Constitutional: Well appearing patient in no acute distress.  Awake and alert, not toxic nor ill in appearance.  HENT: Normocephalic, no obvious evidence of acute trauma.  Eyes: No scleral icterus. Normal conjunctiva   Thorax & Lungs: Normal nonlabored respirations. I appreciate no wheezing, rhonchi or rales. There is normal air movement.  Upon cardiac ascultation I appreciate a regular heart rhythm and a tachycardic rate.   Abdomen: The abdomen is not visibly distended.  Upon palpation she has focal tenderness in the left lower quadrant but no rebound no guarding.  Skin: The exposed portions of skin reveal no obvious rash or other abnormalities.  Extremities/Musculoskeletal: No obvious sign of acute trauma. No asymmetric calf tenderness or edema.   Neurologic: " Alert & oriented. No focal deficits observed.      EKG/LABS  Results for orders placed or performed during the hospital encounter of 04/28/24   CBC WITH DIFFERENTIAL   Result Value Ref Range    WBC 9.2 4.8 - 10.8 K/uL    RBC 5.24 4.20 - 5.40 M/uL    Hemoglobin 15.9 12.0 - 16.0 g/dL    Hematocrit 47.7 (H) 37.0 - 47.0 %    MCV 91.0 81.4 - 97.8 fL    MCH 30.3 27.0 - 33.0 pg    MCHC 33.3 32.2 - 35.5 g/dL    RDW 41.0 35.9 - 50.0 fL    Platelet Count 384 164 - 446 K/uL    MPV 9.8 9.0 - 12.9 fL    Neutrophils-Polys 65.90 44.00 - 72.00 %    Lymphocytes 25.80 22.00 - 41.00 %    Monocytes 4.70 0.00 - 13.40 %    Eosinophils 2.20 0.00 - 6.90 %    Basophils 1.00 0.00 - 1.80 %    Immature Granulocytes 0.40 0.00 - 0.90 %    Nucleated RBC 0.00 0.00 - 0.20 /100 WBC    Neutrophils (Absolute) 6.08 1.82 - 7.42 K/uL    Lymphs (Absolute) 2.38 1.00 - 4.80 K/uL    Monos (Absolute) 0.43 0.00 - 0.85 K/uL    Eos (Absolute) 0.20 0.00 - 0.51 K/uL    Baso (Absolute) 0.09 0.00 - 0.12 K/uL    Immature Granulocytes (abs) 0.04 0.00 - 0.11 K/uL    NRBC (Absolute) 0.00 K/uL   COMP METABOLIC PANEL   Result Value Ref Range    Sodium 137 135 - 145 mmol/L    Potassium 4.0 3.6 - 5.5 mmol/L    Chloride 102 96 - 112 mmol/L    Co2 20 20 - 33 mmol/L    Anion Gap 15.0 7.0 - 16.0    Glucose 91 65 - 99 mg/dL    Bun 10 8 - 22 mg/dL    Creatinine 0.71 0.50 - 1.40 mg/dL    Calcium 9.8 8.4 - 10.2 mg/dL    Correct Calcium 9.0 8.5 - 10.5 mg/dL    AST(SGOT) 21 12 - 45 U/L    ALT(SGPT) 19 2 - 50 U/L    Alkaline Phosphatase 73 30 - 99 U/L    Total Bilirubin 1.1 0.1 - 1.5 mg/dL    Albumin 5.0 (H) 3.2 - 4.9 g/dL    Total Protein 7.9 6.0 - 8.2 g/dL    Globulin 2.9 1.9 - 3.5 g/dL    A-G Ratio 1.7 g/dL   LIPASE   Result Value Ref Range    Lipase 31 11 - 82 U/L   URINALYSIS (UA)    Specimen: Urine   Result Value Ref Range    Color Yellow     Character Clear     Specific Gravity 1.010 <1.035    Ph 5.5 5.0 - 8.0    Glucose Negative Negative mg/dL    Ketones Trace (A) Negative  mg/dL    Protein Negative Negative mg/dL    Bilirubin Negative Negative    Nitrite Negative Negative    Leukocyte Esterase Negative Negative    Occult Blood Negative Negative    Micro Urine Req see below    LACTIC ACID   Result Value Ref Range    Lactic Acid 1.2 0.5 - 2.0 mmol/L   HCG QUAL SERUM   Result Value Ref Range    Beta-Hcg Qualitative Serum Negative Negative   ESTIMATED GFR   Result Value Ref Range    GFR (CKD-EPI) 112 >60 mL/min/1.73 m 2   CoV-2, Flu A/B, And RSV by PCR ([facility])    Specimen: Respirate   Result Value Ref Range    Influenza virus A RNA Negative Negative    Influenza virus B, PCR Negative Negative    RSV, PCR Negative Negative    SARS-CoV-2 by PCR NotDetected     SARS-CoV-2 Source NP Swab      EKG   Result Value Ref Range    Report       [facility] Emergency Dept.    Test Date:  2024  Pt Name:    CHRISTINE LARA             Department: [facility]  MRN:        [id number]                      [facility]   Gender:     Female                       Technician: michel  :        1985                   Requested By:TARYN MORRELL  Order #:    [id number]                    Reading MD: TARYN MORRELL MD    Measurements  Intervals                                Axis  Rate:       127                          P:          70  SC:         154                          QRS:        62  QRSD:       96                           T:          32  QT:         309  QTc:        450    Interpretive Statements  Sinus tachycardia rate of 127, normal SC and QRS intervals, QTc 450, upright  T waves, no ST segment deviation.  My impression of this EKG: No acute  ischemia, sinus tachycardia.  Electronically Signed On 2024 19:46:32 PDT by TARYN MORRELL MD        I have independently interpreted this EKG    RADIOLOGY/PROCEDURES   I have independently interpreted the diagnostic imaging associated with this visit and am waiting the final reading from the radiologist.   My  preliminary interpretation is as follows:   No obvious diverticulitis      Radiologist interpretation:  DX-CHEST-PORTABLE (1 VIEW)   Final Result      1.  There is no acute cardiopulmonary process.      CT-ABDOMEN-PELVIS WITH   Final Result         1. Diverticulosis.   2. Status post cholecystectomy and hysterectomy.          COURSE & MEDICAL DECISION MAKING    ASSESSMENT, COURSE AND PLAN  Care Narrative: This is a 39-year-old female who comes in tachycardic with abdominal pain focal in the left lower quadrant.  She had an episode of tingling in her hands and feet with some lightheadedness prior to arrival that has since resolved.  She does present tachycardic with an elevated temperature albeit not febrile.  She was recently treated for UTI, she is currently on Cipro/Flagyl for clinically suspected diverticulitis and has an outpatient CT scan ordered.  Exam is otherwise unremarkable.  CT scan will be obtained here in emergency department, will obtain blood work as well.  This will include a lactic acid.  She will receive IV fluids.  She declines need for pain medicine at this time.  She will be reevaluated    Her blood work looks great.  No leukocytosis or anemia, normal electrolytes and renal function.  Viral swabs are negative.  Lipase is normal.  Lactic acid is normal.  Urinalysis gives no indication of infection, CT scan reveals no diverticulitis.  Upon reevaluation she still very tachycardic.  The patient tells me that she had issues with tachycardia during 2 of her pregnancies, she was actually on a beta-blocker as prescribed by a cardiologist.  She states that she was also evaluated by an endocrinologist for this with no etiology discovered.  She has not had any issues since then however.  At this point, I did treat her with a dose of metoprolol and her heart rate normalized, down into the double digits.  She still feels shaky.  At this point, no clear urgent emergent etiology exist to explain her symptoms.   She will be discharged to follow-up with her cardiologist regarding her tachycardia as well as a PCP regarding all of these other symptoms she is experiencing.  But at this point she is stable and appropriate to be discharged      DISPOSITION AND DISCUSSIONS    Decision tools and prescription drugs considered including, but not limited to: I did consider prescription for beta-blocker but she reports that she has some leftover from prior treatment and will take that if she remains tachycardic as she arranges follow-up with her cardiologist.    FINAL DIAGNOSIS  1. Left lower quadrant abdominal pain    2. Shakiness    3. Sinus tachycardia           Electronically signed by: Piotr Esposito M.D., 4/28/2024 4:37 PM

## 2024-04-28 NOTE — ED NOTES
To room via wheel chair from triage by triage rn. Pt in no apparent distress, saline lock with blood draw, erp to bedside

## 2024-04-28 NOTE — ED TRIAGE NOTES
"Chief Complaint   Patient presents with    Allergic Reaction     Onset one hour ago feeling lightheaded, confused per spouse ( taking longer to answer questions), tingling hands and feet. Denies SOB or oral/ airway swelling.  On course of cipro and flagy- on day 2 of course.        /75   Pulse (!) 145   Temp 37.8 °C (100 °F) (Temporal)   Resp 20   Ht 1.626 m (5' 4\")   Wt 59.7 kg (131 lb 9.8 oz)   LMP 10/11/2017 (LMP Unknown)   SpO2 99%   BMI 22.59 kg/m²     Pt AO4, amb w steady gait.   Heart sounds auscultated- normal S1S2, tachycardic and occasionally irregular. Lung sounds clear. Triage EKG ordered.   "

## 2024-04-28 NOTE — ED NOTES
"Poc reviewed, ivf infusing, called to room for pt c/o \"chest tingling\". Erp aware, orders recvd. Moniter =st with hr increased to 250's  "

## 2024-04-28 NOTE — ED NOTES
Medication history reviewed with pt. Med rec is complete.  Allergies reviewed, per pt  Interviewed pt with  at bedside with permission from pt.    Pt reports that she did not start CEFDINIR 300MG that was prescribed to pt on 4/25/2024.    Patient has had any outpatient antibiotics in the last 30 days, pt started BACTRIM 800-160MG on 4/16/2024 for 7 day course.  Per pt reports that she finish course of antibiotic.   Pt also started CIPRO 500MG and METRONIDAZOLE 500MG on 4/26/2024 for 7 day course.    Pt is not on any anticoagulants

## 2024-04-29 NOTE — ED NOTES
Discharge instructions given. Follow up with primary care doctor. Return to ED for worsening symptoms.

## 2024-04-29 NOTE — ED NOTES
Pt rounding upon return from ct-vs as charted, urine spec obtained and to lab. Per pt, previous chest numbness and tingling was gone before med recvd. Pt restfull with spouse at bedside, though shivering noted

## 2024-09-09 ENCOUNTER — HOSPITAL ENCOUNTER (OUTPATIENT)
Facility: MEDICAL CENTER | Age: 39
End: 2024-09-09
Attending: OBSTETRICS & GYNECOLOGY
Payer: COMMERCIAL

## 2024-09-09 PROCEDURE — 87624 HPV HI-RISK TYP POOLED RSLT: CPT

## 2024-09-09 PROCEDURE — 88175 CYTOPATH C/V AUTO FLUID REDO: CPT

## 2024-09-13 LAB
CYTOLOGIST CVX/VAG CYTO: NORMAL
CYTOLOGY CVX/VAG DOC CYTO: NORMAL
CYTOLOGY CVX/VAG DOC THIN PREP: NORMAL
HPV I/H RISK 4 DNA CVX QL PROBE+SIG AMP: NEGATIVE
NOTE NL11727A: NORMAL
OTHER STN SPEC: NORMAL
QC REVIEWED BY NL11722A: NORMAL
STAT OF ADQ CVX/VAG CYTO-IMP: NORMAL

## 2025-08-20 ENCOUNTER — APPOINTMENT (OUTPATIENT)
Dept: RADIOLOGY | Facility: MEDICAL CENTER | Age: 40
End: 2025-08-20
Attending: OBSTETRICS & GYNECOLOGY
Payer: COMMERCIAL

## 2025-08-21 ENCOUNTER — HOSPITAL ENCOUNTER (OUTPATIENT)
Dept: LAB | Facility: MEDICAL CENTER | Age: 40
End: 2025-08-21
Attending: NURSE PRACTITIONER
Payer: COMMERCIAL

## 2025-08-21 LAB
ALBUMIN SERPL BCP-MCNC: 4.6 G/DL (ref 3.2–4.9)
ALBUMIN/GLOB SERPL: 2.3 G/DL
ALP SERPL-CCNC: 50 U/L (ref 30–99)
ALT SERPL-CCNC: 11 U/L (ref 2–50)
ANION GAP SERPL CALC-SCNC: 13 MMOL/L (ref 7–16)
AST SERPL-CCNC: 16 U/L (ref 12–45)
BILIRUB SERPL-MCNC: 1.6 MG/DL (ref 0.1–1.5)
BUN SERPL-MCNC: 13 MG/DL (ref 8–22)
CALCIUM ALBUM COR SERPL-MCNC: 9.1 MG/DL (ref 8.5–10.5)
CALCIUM SERPL-MCNC: 9.6 MG/DL (ref 8.5–10.5)
CHLORIDE SERPL-SCNC: 102 MMOL/L (ref 96–112)
CHOLEST SERPL-MCNC: 191 MG/DL (ref 100–199)
CO2 SERPL-SCNC: 22 MMOL/L (ref 20–33)
CREAT SERPL-MCNC: 0.83 MG/DL (ref 0.5–1.4)
EST. AVERAGE GLUCOSE BLD GHB EST-MCNC: 94 MG/DL
ESTRADIOL SERPL-MCNC: 122 PG/ML
FASTING STATUS PATIENT QL REPORTED: NORMAL
FSH SERPL-ACNC: 4.6 MIU/ML
GFR SERPLBLD CREATININE-BSD FMLA CKD-EPI: 92 ML/MIN/1.73 M 2
GLOBULIN SER CALC-MCNC: 2 G/DL (ref 1.9–3.5)
GLUCOSE SERPL-MCNC: 84 MG/DL (ref 65–99)
HBA1C MFR BLD: 4.9 % (ref 4–5.6)
HDLC SERPL-MCNC: 67 MG/DL
LDLC SERPL CALC-MCNC: 107 MG/DL
LH SERPL-ACNC: 8.5 IU/L
POTASSIUM SERPL-SCNC: 4.3 MMOL/L (ref 3.6–5.5)
PROGEST SERPL-MCNC: 0.23 NG/ML
PROT SERPL-MCNC: 6.6 G/DL (ref 6–8.2)
SODIUM SERPL-SCNC: 137 MMOL/L (ref 135–145)
T4 FREE SERPL-MCNC: 1.18 NG/DL (ref 0.93–1.7)
TRIGL SERPL-MCNC: 84 MG/DL (ref 0–149)
TSH SERPL-ACNC: 1.33 UIU/ML (ref 0.38–5.33)

## 2025-08-21 PROCEDURE — 36415 COLL VENOUS BLD VENIPUNCTURE: CPT

## 2025-08-21 PROCEDURE — 83001 ASSAY OF GONADOTROPIN (FSH): CPT

## 2025-08-21 PROCEDURE — 84439 ASSAY OF FREE THYROXINE: CPT

## 2025-08-21 PROCEDURE — 82670 ASSAY OF TOTAL ESTRADIOL: CPT

## 2025-08-21 PROCEDURE — 83002 ASSAY OF GONADOTROPIN (LH): CPT

## 2025-08-21 PROCEDURE — 83036 HEMOGLOBIN GLYCOSYLATED A1C: CPT

## 2025-08-21 PROCEDURE — 84443 ASSAY THYROID STIM HORMONE: CPT

## 2025-08-21 PROCEDURE — 84144 ASSAY OF PROGESTERONE: CPT

## 2025-08-21 PROCEDURE — 80053 COMPREHEN METABOLIC PANEL: CPT

## 2025-08-21 PROCEDURE — 80061 LIPID PANEL: CPT
